# Patient Record
Sex: MALE | Race: WHITE | Employment: OTHER | URBAN - METROPOLITAN AREA
[De-identification: names, ages, dates, MRNs, and addresses within clinical notes are randomized per-mention and may not be internally consistent; named-entity substitution may affect disease eponyms.]

---

## 2022-05-21 ENCOUNTER — HOSPITAL ENCOUNTER (INPATIENT)
Age: 67
LOS: 3 days | Discharge: HOME | DRG: 392 | End: 2022-05-24
Attending: EMERGENCY MEDICINE | Admitting: INTERNAL MEDICINE
Payer: MEDICARE

## 2022-05-21 ENCOUNTER — APPOINTMENT (OUTPATIENT)
Dept: CT IMAGING | Facility: CLINIC | Age: 67
DRG: 392 | End: 2022-05-21
Payer: MEDICARE

## 2022-05-21 DIAGNOSIS — K57.20 DIVERTICULITIS OF COLON WITH PERFORATION: Primary | ICD-10-CM

## 2022-05-21 PROBLEM — C34.91 PRIMARY MALIGNANT NEOPLASM OF RIGHT LUNG (HCC): Status: ACTIVE | Noted: 2017-02-01

## 2022-05-21 PROBLEM — G47.33 OSA (OBSTRUCTIVE SLEEP APNEA): Status: ACTIVE | Noted: 2017-10-02

## 2022-05-21 PROBLEM — Z95.1 HISTORY OF QUADRUPLE BYPASS: Status: ACTIVE | Noted: 2022-05-21

## 2022-05-21 PROBLEM — I25.10 CAD (CORONARY ARTERY DISEASE): Status: ACTIVE | Noted: 2022-05-21

## 2022-05-21 PROBLEM — E78.5 HYPERLIPIDEMIA: Status: ACTIVE | Noted: 2022-05-21

## 2022-05-21 PROBLEM — I10 HYPERTENSION: Status: ACTIVE | Noted: 2022-05-21

## 2022-05-21 PROBLEM — E11.9 DIABETES MELLITUS (HCC): Status: ACTIVE | Noted: 2022-05-21

## 2022-05-21 LAB
ABSOLUTE EOS #: 0 K/UL (ref 0–0.4)
ABSOLUTE LYMPH #: 0.91 K/UL (ref 1–4.8)
ABSOLUTE MONO #: 2.72 K/UL (ref 0.1–0.8)
ALBUMIN SERPL-MCNC: 4.1 G/DL (ref 3.5–5.2)
ALBUMIN/GLOBULIN RATIO: 1.1 (ref 1–2.5)
ALP BLD-CCNC: 64 U/L (ref 40–129)
ALT SERPL-CCNC: 19 U/L (ref 5–41)
AMYLASE: 25 U/L (ref 28–100)
ANION GAP SERPL CALCULATED.3IONS-SCNC: 13 MMOL/L (ref 9–17)
AST SERPL-CCNC: 11 U/L
BASOPHILS # BLD: 1 % (ref 0–2)
BASOPHILS ABSOLUTE: 0.18 K/UL (ref 0–0.2)
BILIRUB SERPL-MCNC: 1.1 MG/DL (ref 0.3–1.2)
BILIRUBIN DIRECT: 0.31 MG/DL
BILIRUBIN, INDIRECT: 0.79 MG/DL (ref 0–1)
BUN BLDV-MCNC: 14 MG/DL (ref 8–23)
CALCIUM SERPL-MCNC: 9.3 MG/DL (ref 8.6–10.4)
CHLORIDE BLD-SCNC: 96 MMOL/L (ref 98–107)
CO2: 22 MMOL/L (ref 20–31)
CREAT SERPL-MCNC: 0.8 MG/DL (ref 0.7–1.2)
EOSINOPHILS RELATIVE PERCENT: 0 % (ref 1–4)
GFR AFRICAN AMERICAN: >60 ML/MIN
GFR NON-AFRICAN AMERICAN: >60 ML/MIN
GFR SERPL CREATININE-BSD FRML MDRD: ABNORMAL ML/MIN/{1.73_M2}
GLUCOSE BLD-MCNC: 115 MG/DL (ref 75–110)
GLUCOSE BLD-MCNC: 145 MG/DL (ref 75–110)
GLUCOSE BLD-MCNC: 196 MG/DL (ref 70–99)
HCT VFR BLD CALC: 46.2 % (ref 41–53)
HEMOGLOBIN: 15.3 G/DL (ref 13.5–17.5)
LACTIC ACID, SEPSIS: 1.4 MMOL/L (ref 0.5–1.9)
LIPASE: 38 U/L (ref 13–60)
LYMPHOCYTES # BLD: 5 % (ref 24–44)
MCH RBC QN AUTO: 30.3 PG (ref 26–34)
MCHC RBC AUTO-ENTMCNC: 33 G/DL (ref 31–37)
MCV RBC AUTO: 91.8 FL (ref 80–100)
MONOCYTES # BLD: 15 % (ref 1–7)
MORPHOLOGY: NORMAL
PDW BLD-RTO: 14.4 % (ref 12.5–15.4)
PLATELET # BLD: 226 K/UL (ref 140–450)
PMV BLD AUTO: 7.9 FL (ref 6–12)
POTASSIUM SERPL-SCNC: 4.1 MMOL/L (ref 3.7–5.3)
PRO-BNP: 90 PG/ML
RBC # BLD: 5.03 M/UL (ref 4.5–5.9)
SARS-COV-2, RAPID: NOT DETECTED
SEG NEUTROPHILS: 79 % (ref 36–66)
SEGMENTED NEUTROPHILS ABSOLUTE COUNT: 14.29 K/UL (ref 1.8–7.7)
SODIUM BLD-SCNC: 131 MMOL/L (ref 135–144)
SPECIMEN DESCRIPTION: NORMAL
TOTAL PROTEIN: 7.7 G/DL (ref 6.4–8.3)
TROPONIN, HIGH SENSITIVITY: 10 NG/L (ref 0–22)
WBC # BLD: 18.1 K/UL (ref 3.5–11)

## 2022-05-21 PROCEDURE — 6370000000 HC RX 637 (ALT 250 FOR IP): Performed by: NURSE PRACTITIONER

## 2022-05-21 PROCEDURE — 96361 HYDRATE IV INFUSION ADD-ON: CPT

## 2022-05-21 PROCEDURE — 96365 THER/PROPH/DIAG IV INF INIT: CPT

## 2022-05-21 PROCEDURE — 82947 ASSAY GLUCOSE BLOOD QUANT: CPT

## 2022-05-21 PROCEDURE — 80048 BASIC METABOLIC PNL TOTAL CA: CPT

## 2022-05-21 PROCEDURE — 83605 ASSAY OF LACTIC ACID: CPT

## 2022-05-21 PROCEDURE — 83880 ASSAY OF NATRIURETIC PEPTIDE: CPT

## 2022-05-21 PROCEDURE — 82150 ASSAY OF AMYLASE: CPT

## 2022-05-21 PROCEDURE — 6370000000 HC RX 637 (ALT 250 FOR IP): Performed by: EMERGENCY MEDICINE

## 2022-05-21 PROCEDURE — 6360000002 HC RX W HCPCS: Performed by: NURSE PRACTITIONER

## 2022-05-21 PROCEDURE — 80076 HEPATIC FUNCTION PANEL: CPT

## 2022-05-21 PROCEDURE — 87040 BLOOD CULTURE FOR BACTERIA: CPT

## 2022-05-21 PROCEDURE — 2580000003 HC RX 258: Performed by: EMERGENCY MEDICINE

## 2022-05-21 PROCEDURE — 6360000004 HC RX CONTRAST MEDICATION: Performed by: EMERGENCY MEDICINE

## 2022-05-21 PROCEDURE — 84484 ASSAY OF TROPONIN QUANT: CPT

## 2022-05-21 PROCEDURE — 83690 ASSAY OF LIPASE: CPT

## 2022-05-21 PROCEDURE — 2060000000 HC ICU INTERMEDIATE R&B

## 2022-05-21 PROCEDURE — 2500000003 HC RX 250 WO HCPCS: Performed by: NURSE PRACTITIONER

## 2022-05-21 PROCEDURE — 87635 SARS-COV-2 COVID-19 AMP PRB: CPT

## 2022-05-21 PROCEDURE — 99223 1ST HOSP IP/OBS HIGH 75: CPT | Performed by: NURSE PRACTITIONER

## 2022-05-21 PROCEDURE — 74177 CT ABD & PELVIS W/CONTRAST: CPT

## 2022-05-21 PROCEDURE — 93005 ELECTROCARDIOGRAM TRACING: CPT | Performed by: EMERGENCY MEDICINE

## 2022-05-21 PROCEDURE — 99285 EMERGENCY DEPT VISIT HI MDM: CPT

## 2022-05-21 PROCEDURE — 2580000003 HC RX 258: Performed by: NURSE PRACTITIONER

## 2022-05-21 PROCEDURE — 6360000002 HC RX W HCPCS: Performed by: EMERGENCY MEDICINE

## 2022-05-21 PROCEDURE — 36415 COLL VENOUS BLD VENIPUNCTURE: CPT

## 2022-05-21 PROCEDURE — 85025 COMPLETE CBC W/AUTO DIFF WBC: CPT

## 2022-05-21 RX ORDER — METFORMIN HYDROCHLORIDE EXTENDED-RELEASE TABLETS 1000 MG/1
2000 TABLET, FILM COATED, EXTENDED RELEASE ORAL
COMMUNITY

## 2022-05-21 RX ORDER — SODIUM CHLORIDE 0.9 % (FLUSH) 0.9 %
5-40 SYRINGE (ML) INJECTION EVERY 12 HOURS SCHEDULED
Status: DISCONTINUED | OUTPATIENT
Start: 2022-05-21 | End: 2022-05-24 | Stop reason: HOSPADM

## 2022-05-21 RX ORDER — LOSARTAN POTASSIUM 50 MG/1
50 TABLET ORAL DAILY
COMMUNITY

## 2022-05-21 RX ORDER — INSULIN LISPRO 100 [IU]/ML
0-3 INJECTION, SOLUTION INTRAVENOUS; SUBCUTANEOUS NIGHTLY
Status: CANCELLED | OUTPATIENT
Start: 2022-05-21

## 2022-05-21 RX ORDER — INSULIN LISPRO 100 [IU]/ML
0-6 INJECTION, SOLUTION INTRAVENOUS; SUBCUTANEOUS
Status: DISCONTINUED | OUTPATIENT
Start: 2022-05-21 | End: 2022-05-24 | Stop reason: HOSPADM

## 2022-05-21 RX ORDER — DEXTROSE MONOHYDRATE 50 MG/ML
100 INJECTION, SOLUTION INTRAVENOUS PRN
Status: CANCELLED | OUTPATIENT
Start: 2022-05-21

## 2022-05-21 RX ORDER — ONDANSETRON 2 MG/ML
4 INJECTION INTRAMUSCULAR; INTRAVENOUS EVERY 6 HOURS PRN
Status: DISCONTINUED | OUTPATIENT
Start: 2022-05-21 | End: 2022-05-24 | Stop reason: HOSPADM

## 2022-05-21 RX ORDER — INSULIN LISPRO 100 [IU]/ML
0-3 INJECTION, SOLUTION INTRAVENOUS; SUBCUTANEOUS NIGHTLY
Status: DISCONTINUED | OUTPATIENT
Start: 2022-05-21 | End: 2022-05-24 | Stop reason: HOSPADM

## 2022-05-21 RX ORDER — POLYETHYLENE GLYCOL 3350 17 G/17G
17 POWDER, FOR SOLUTION ORAL DAILY PRN
Status: DISCONTINUED | OUTPATIENT
Start: 2022-05-21 | End: 2022-05-24 | Stop reason: HOSPADM

## 2022-05-21 RX ORDER — ALBUTEROL SULFATE 90 UG/1
2 AEROSOL, METERED RESPIRATORY (INHALATION) EVERY 4 HOURS PRN
Status: DISCONTINUED | OUTPATIENT
Start: 2022-05-21 | End: 2022-05-24 | Stop reason: HOSPADM

## 2022-05-21 RX ORDER — ORAL SEMAGLUTIDE 3 MG/1
3 TABLET ORAL DAILY
COMMUNITY

## 2022-05-21 RX ORDER — SODIUM CHLORIDE 9 MG/ML
INJECTION, SOLUTION INTRAVENOUS CONTINUOUS
Status: DISCONTINUED | OUTPATIENT
Start: 2022-05-21 | End: 2022-05-23

## 2022-05-21 RX ORDER — UMECLIDINIUM 62.5 UG/1
1 AEROSOL, POWDER ORAL DAILY
COMMUNITY

## 2022-05-21 RX ORDER — PANTOPRAZOLE SODIUM 40 MG/1
40 TABLET, DELAYED RELEASE ORAL DAILY
COMMUNITY

## 2022-05-21 RX ORDER — METOPROLOL TARTRATE 5 MG/5ML
2.5 INJECTION INTRAVENOUS EVERY 12 HOURS
Status: DISCONTINUED | OUTPATIENT
Start: 2022-05-21 | End: 2022-05-22

## 2022-05-21 RX ORDER — ATORVASTATIN CALCIUM 40 MG/1
40 TABLET, FILM COATED ORAL DAILY
COMMUNITY

## 2022-05-21 RX ORDER — MAGNESIUM SULFATE 1 G/100ML
1000 INJECTION INTRAVENOUS PRN
Status: DISCONTINUED | OUTPATIENT
Start: 2022-05-21 | End: 2022-05-24 | Stop reason: HOSPADM

## 2022-05-21 RX ORDER — POTASSIUM CHLORIDE 20 MEQ/1
40 TABLET, EXTENDED RELEASE ORAL PRN
Status: DISCONTINUED | OUTPATIENT
Start: 2022-05-21 | End: 2022-05-24 | Stop reason: HOSPADM

## 2022-05-21 RX ORDER — POTASSIUM CHLORIDE 7.45 MG/ML
10 INJECTION INTRAVENOUS PRN
Status: DISCONTINUED | OUTPATIENT
Start: 2022-05-21 | End: 2022-05-24 | Stop reason: HOSPADM

## 2022-05-21 RX ORDER — 0.9 % SODIUM CHLORIDE 0.9 %
70 INTRAVENOUS SOLUTION INTRAVENOUS ONCE
Status: DISCONTINUED | OUTPATIENT
Start: 2022-05-21 | End: 2022-05-24 | Stop reason: HOSPADM

## 2022-05-21 RX ORDER — SODIUM CHLORIDE 9 MG/ML
INJECTION, SOLUTION INTRAVENOUS PRN
Status: DISCONTINUED | OUTPATIENT
Start: 2022-05-21 | End: 2022-05-24 | Stop reason: HOSPADM

## 2022-05-21 RX ORDER — ACETAMINOPHEN 500 MG
1000 TABLET ORAL ONCE
Status: COMPLETED | OUTPATIENT
Start: 2022-05-21 | End: 2022-05-21

## 2022-05-21 RX ORDER — SODIUM CHLORIDE 0.9 % (FLUSH) 0.9 %
10 SYRINGE (ML) INJECTION PRN
Status: DISCONTINUED | OUTPATIENT
Start: 2022-05-21 | End: 2022-05-24 | Stop reason: HOSPADM

## 2022-05-21 RX ORDER — ALBUTEROL SULFATE 90 UG/1
2 AEROSOL, METERED RESPIRATORY (INHALATION)
COMMUNITY

## 2022-05-21 RX ORDER — DEXTROSE MONOHYDRATE 50 MG/ML
100 INJECTION, SOLUTION INTRAVENOUS PRN
Status: DISCONTINUED | OUTPATIENT
Start: 2022-05-21 | End: 2022-05-24 | Stop reason: HOSPADM

## 2022-05-21 RX ORDER — INSULIN LISPRO 100 [IU]/ML
0-6 INJECTION, SOLUTION INTRAVENOUS; SUBCUTANEOUS
Status: CANCELLED | OUTPATIENT
Start: 2022-05-21

## 2022-05-21 RX ORDER — SODIUM CHLORIDE 0.9 % (FLUSH) 0.9 %
10 SYRINGE (ML) INJECTION PRN
Status: DISCONTINUED | OUTPATIENT
Start: 2022-05-21 | End: 2022-05-21

## 2022-05-21 RX ORDER — MORPHINE SULFATE 2 MG/ML
2 INJECTION, SOLUTION INTRAMUSCULAR; INTRAVENOUS
Status: DISCONTINUED | OUTPATIENT
Start: 2022-05-21 | End: 2022-05-24 | Stop reason: HOSPADM

## 2022-05-21 RX ORDER — METOPROLOL TARTRATE 5 MG/5ML
2.5 INJECTION INTRAVENOUS EVERY 6 HOURS
Status: DISCONTINUED | OUTPATIENT
Start: 2022-05-21 | End: 2022-05-21

## 2022-05-21 RX ORDER — 0.9 % SODIUM CHLORIDE 0.9 %
500 INTRAVENOUS SOLUTION INTRAVENOUS ONCE
Status: COMPLETED | OUTPATIENT
Start: 2022-05-21 | End: 2022-05-21

## 2022-05-21 RX ADMIN — IOPAMIDOL 75 ML: 755 INJECTION, SOLUTION INTRAVENOUS at 10:50

## 2022-05-21 RX ADMIN — INSULIN LISPRO 1 UNITS: 100 INJECTION, SOLUTION INTRAVENOUS; SUBCUTANEOUS at 19:52

## 2022-05-21 RX ADMIN — SODIUM CHLORIDE: 9 INJECTION, SOLUTION INTRAVENOUS at 16:57

## 2022-05-21 RX ADMIN — SODIUM CHLORIDE, PRESERVATIVE FREE 10 ML: 5 INJECTION INTRAVENOUS at 10:51

## 2022-05-21 RX ADMIN — PIPERACILLIN AND TAZOBACTAM 3375 MG: 3; .375 INJECTION, POWDER, LYOPHILIZED, FOR SOLUTION INTRAVENOUS at 23:04

## 2022-05-21 RX ADMIN — PIPERACILLIN AND TAZOBACTAM 3375 MG: 3; .375 INJECTION, POWDER, LYOPHILIZED, FOR SOLUTION INTRAVENOUS at 12:00

## 2022-05-21 RX ADMIN — MORPHINE SULFATE 2 MG: 2 INJECTION, SOLUTION INTRAMUSCULAR; INTRAVENOUS at 23:05

## 2022-05-21 RX ADMIN — PIPERACILLIN AND TAZOBACTAM 4500 MG: 4; .5 INJECTION, POWDER, LYOPHILIZED, FOR SOLUTION INTRAVENOUS at 18:08

## 2022-05-21 RX ADMIN — SODIUM CHLORIDE 500 ML: 9 INJECTION, SOLUTION INTRAVENOUS at 09:55

## 2022-05-21 RX ADMIN — ACETAMINOPHEN 1000 MG: 500 TABLET ORAL at 09:31

## 2022-05-21 RX ADMIN — Medication 70 ML: at 10:51

## 2022-05-21 RX ADMIN — SODIUM CHLORIDE, PRESERVATIVE FREE 20 MG: 5 INJECTION INTRAVENOUS at 19:42

## 2022-05-21 RX ADMIN — METOPROLOL TARTRATE 2.5 MG: 1 INJECTION, SOLUTION INTRAVENOUS at 19:42

## 2022-05-21 ASSESSMENT — PAIN SCALES - GENERAL
PAINLEVEL_OUTOF10: 6
PAINLEVEL_OUTOF10: 6
PAINLEVEL_OUTOF10: 3
PAINLEVEL_OUTOF10: 6
PAINLEVEL_OUTOF10: 3
PAINLEVEL_OUTOF10: 6

## 2022-05-21 ASSESSMENT — PAIN DESCRIPTION - LOCATION
LOCATION: ABDOMEN
LOCATION: ABDOMEN;HEAD
LOCATION: ABDOMEN

## 2022-05-21 ASSESSMENT — PAIN DESCRIPTION - ORIENTATION: ORIENTATION: RIGHT

## 2022-05-21 ASSESSMENT — ENCOUNTER SYMPTOMS
DIARRHEA: 0
VOMITING: 0
SHORTNESS OF BREATH: 0
ABDOMINAL PAIN: 1
STRIDOR: 0
COUGH: 0
BLOOD IN STOOL: 0
NAUSEA: 0
CONSTIPATION: 0
WHEEZING: 0

## 2022-05-21 ASSESSMENT — PAIN DESCRIPTION - FREQUENCY
FREQUENCY: CONTINUOUS
FREQUENCY: INTERMITTENT

## 2022-05-21 ASSESSMENT — PAIN DESCRIPTION - DESCRIPTORS
DESCRIPTORS: CRAMPING
DESCRIPTORS: CRAMPING

## 2022-05-21 ASSESSMENT — PAIN - FUNCTIONAL ASSESSMENT: PAIN_FUNCTIONAL_ASSESSMENT: 0-10

## 2022-05-21 NOTE — CONSULTS
General Surgery:  Consult Note        PATIENT NAME: Kraig Aldana   YOB: 1955    ADMISSION DATE: 5/21/2022  9:12 AM     Admitting Provider: Dr. Eri Miles Physician: Dr. Jaqueline Noel: 5/21/2022    Chief Complaint: Abdominal pain  Consult Regarding: Acute sigmoid diverticulitis with microperforation    HISTORY OF PRESENT ILLNESS:  The patient is a 79 y.o. male who presented to an outside emergency department with 3 days of abdominal pain. At outlying facility patient was noted to have leukocytosis of 18.1 as well as a CT scan which showed diverticulitis of the sigmoid colon with small dots of free air in the mesentery. Patient was transferred to [facility] for evaluation by general surgery. Noted that he had some fever and chills at his house. He has also had some associated nausea. States that he has had diarrhea but he has diarrhea most days even before the abdominal pain. States he had an episode of pain like this about 1 year ago, he did not seek treatment at that time. Patient denies any prior abdominal surgeries. Patient states he has had colonoscopy within the last 10 years, which was normal.  Patient has had coronary artery bypass surgery as well as a lung procedure. Patient has medical history of diabetes mellitus, hyperlipidemia, hypertension.       Past Medical History:        Diagnosis Date    CAD (coronary artery disease)     Diabetes mellitus ([facility] [address]     History of quadruple bypass     Hyperlipidemia     Hypertension        Past Surgical History:        Procedure Laterality Date    CARDIAC SURGERY         Medications Prior to Admission:   Medications Prior to Admission: albuterol sulfate HFA (VENTOLIN HFA) 108 (90 Base) MCG/ACT inhaler, Inhale 2 puffs into the lungs every 4-6 hours as needed for Wheezing  atorvastatin (LIPITOR) 40 MG tablet, Take 40 mg by mouth daily  vitamin D (CHOLECALCIFEROL) 25 MCG (1000 UT) TABS tablet, Take 1,000 Units by mouth daily  losartan (COZAAR) 50 MG tablet, Take 50 mg by mouth daily  metoprolol tartrate (LOPRESSOR) 25 MG tablet, Take 12.5 mg by mouth daily Takes 1/2 tab (=12.5mg) once daily  pantoprazole (PROTONIX) 40 MG tablet, Take 40 mg by mouth daily  Semaglutide (RYBELSUS) 3 MG TABS, Take 3 mg by mouth daily  Umeclidinium Bromide (INCRUSE ELLIPTA) 62.5 MCG/INH AEPB, Inhale 1 puff into the lungs daily  metFORMIN, OSM, (FORTAMET) 1000 MG extended release tablet, Take 2,000 mg by mouth daily (with breakfast) Takes 2 tabs (=2000mg) once daily  Propylene Glycol (SYSTANE COMPLETE OP), Place 1 drop into both eyes 4 times daily    Allergies:  Cat hair extract and Shrimp (diagnostic)    Social History:   Social History     Socioeconomic History    Marital status:      Spouse name: Not on file    Number of children: Not on file    Years of education: Not on file    Highest education level: Not on file   Occupational History    Not on file   Tobacco Use    Smoking status: Former Smoker    Smokeless tobacco: Never Used   Vaping Use    Vaping Use: Never used   Substance and Sexual Activity    Alcohol use: Never    Drug use: Never    Sexual activity: Not on file   Other Topics Concern    Not on file   Social History Narrative    Not on file     Social Determinants of Health     Financial Resource Strain:     Difficulty of Paying Living Expenses: Not on file   Food Insecurity:     Worried About Running Out of Food in the Last Year: Not on file    Eugenio of Food in the Last Year: Not on file   Transportation Needs:     Lack of Transportation (Medical): Not on file    Lack of Transportation (Non-Medical):  Not on file   Physical Activity:     Days of Exercise per Week: Not on file    Minutes of Exercise per Session: Not on file   Stress:     Feeling of Stress : Not on file   Social Connections:     Frequency of Communication with Friends and Family: Not on file    Frequency of Social Gatherings with Friends and Family: Not on file    Attends Caodaism Services: Not on file    Active Member of Clubs or Organizations: Not on file    Attends Club or Organization Meetings: Not on file    Marital Status: Not on file   Intimate Partner Violence:     Fear of Current or Ex-Partner: Not on file    Emotionally Abused: Not on file    Physically Abused: Not on file    Sexually Abused: Not on file   Housing Stability:     Unable to Pay for Housing in the Last Year: Not on file    Number of [place] in the Last Year: Not on file    Unstable Housing in the Last Year: Not on file       Family History:   History reviewed. No pertinent family history. REVIEW OF SYSTEMS:    CONSTITUTIONAL: Positive for fevers/chills  HEENT: Denies rhinorrhea, dysphagia, odynphagia. CARDIOVASCULAR: Denies recent chest pain; positive for history of CABG  RESPIRATORY: Denies recent history of shortness of breath; positive for history of lung surgery  GASTROINTESTINAL: Positive for abdominal pain, nausea, diarrhea  GENITOURINARY: Denies increased frequency or dysuria. HEMATOLOGIC/LYMPHATIC: Denies history of anemia or DVTs. ENDOCRINE: Positive for diabetes  NEURO: Denies history of CVA, TIA. Review of systems negative unless listed above. PHYSICAL EXAM:    VITALS:  /83   Pulse 117   Temp 98.5 °F (36.9 °C) (Oral)   Resp 18   Ht 5' 10\" (1.778 m)   Wt 264 lb (119.7 kg)   SpO2 96%   BMI 37.88 kg/m²   INTAKE/OUTPUT:   No intake or output data in the 24 hours ending 05/21/22 1741    CONSTITUTIONAL:  awake, alert, not distressed  HEENT: Normocephalic/atraumatic, without obvious abnormality. NECK:  Supple, symmetrical, trachea midline   CARDIOVASCULAR: Tachycardia with regular rhythm. LUNGS: Good chest rise bilaterally, no accessory muscle use  ABDOMEN: Soft, distended, tender to palpation in the right hemiabdomen, not peritoneal  MUSCULOSKELETAL: Muscle strength intact in all extremities bilaterally.   NEUROLOGIC: CN II- XII intact. Gross motor intact without focal weakness. SKIN: No cyanosis, rashes, or edema noted. Orientation:   oriented to person, place, and time    CBC with Differential:    Lab Results   Component Value Date    WBC 18.1 05/21/2022    RBC 5.03 05/21/2022    HGB 15.3 05/21/2022    HCT 46.2 05/21/2022     05/21/2022    MCV 91.8 05/21/2022    MCH 30.3 05/21/2022    MCHC 33.0 05/21/2022    RDW 14.4 05/21/2022    LYMPHOPCT 5 05/21/2022    MONOPCT 15 05/21/2022    BASOPCT 1 05/21/2022    MONOSABS 2.72 05/21/2022    LYMPHSABS 0.91 05/21/2022    EOSABS 0.00 05/21/2022    BASOSABS 0.18 05/21/2022     BMP:    Lab Results   Component Value Date     05/21/2022    K 4.1 05/21/2022    CL 96 05/21/2022    CO2 22 05/21/2022    BUN 14 05/21/2022    LABALBU 4.1 05/21/2022    CREATININE 0.80 05/21/2022    CALCIUM 9.3 05/21/2022    GFRAA >60 05/21/2022    LABGLOM >60 05/21/2022    GLUCOSE 196 05/21/2022       Pertinent Radiology:   CT ABDOMEN PELVIS W IV CONTRAST Additional Contrast? None    Result Date: 5/21/2022  EXAMINATION: CT OF THE ABDOMEN AND PELVIS WITH CONTRAST 5/21/2022 10:33 am TECHNIQUE: CT of the abdomen and pelvis was performed with the administration of intravenous contrast. Multiplanar reformatted images are provided for review. Automated exposure control, iterative reconstruction, and/or weight based adjustment of the mA/kV was utilized to reduce the radiation dose to as low as reasonably achievable. COMPARISON: None. HISTORY: ORDERING SYSTEM PROVIDED HISTORY: Pain TECHNOLOGIST PROVIDED HISTORY: Pain Decision Support Exception - unselect if not a suspected or confirmed emergency medical condition->Emergency Medical Condition (MA) Reason for Exam: Pt. C/o abdominal pain x 3 days. Hx hernia, open heart, lung cancer. FINDINGS: Lower Chest: Several granulomatous calcifications visualized lung bases otherwise unremarkable appearance. Organs: Hepatic steatosis. No focal lesion.   Spleen pancreas adrenal glands unremarkable. Prior cholecystectomy. Rounded hypodensity inferior left kidney most likely cyst or proximally 3 cm.  5 mm nonobstructing superior pole right nephrolithiasis. Additional subcentimeter probable right renal cyst. GI/Bowel: Stomach is normal.  Small bowel is normal.  Appendix is normal. Numerous diverticula throughout the distal colon. Significant wall thickening at the sigmoid with adjacent fat stranding and a small foci of intraperitoneal air regional to mid sigmoid colon consistent with local perforation. No abscess collection. No additional free air visualized. Pelvis: Urinary bladder demonstrates mild wall thickening which may be reactive to adjacent inflammation, underdistention or cystitis. Prostate enlarged. Peritoneum/Retroperitoneum: Atherosclerosis abdominal aorta without aneurysm. Otherwise unremarkable. Bones/Soft Tissues: No acute osseous abnormality. 1. Severe acute diverticulitis/colitis sigmoid colon with the adjacent fat stranding and regional intraperitoneal free air consistent with local perforation. No abscess collection at this time. 2. Urinary bladder wall thickening may be underdistention or cystitis which could be related to adjacent inflammatory change. Motion clinical findings and urinalysis required. 3. Hepatic steatosis. 4. Nonobstructing right nephrolithiasis. 5. Bilateral probable renal cysts. 6. Prostatomegaly. Critical results were called by Dr. Marlo Drake DO to Dr. Alexandra Garcia on 5/21/2022 at 11:35. ASSESSMENT:  Active Hospital Problems    Diagnosis Date Noted    Diverticulitis of colon with perforation [K57.20] 05/21/2022     Priority: Medium       3 26-year-old male with acute sigmoid diverticulitis with microperforation    Plan:  1. Continue medical mgmt and supportive care per primary  2. NPO, bowel rest  3. IVF  4. Continue IV Zosyn  5.  Discussed with patient conservative management at this time, however stated that if patient worsens he may need emergent operative intervention  6. Also discussed that patient will likely need sigmoid resection in the future if he is able to get through this acute episode without surgical intervention    Discussed with Dr. Damaris Parisi      Electronically signed by Quincy Summers DO  on 5/21/2022 at 5:41 PM   Attending Physician Statement  I have discussed the case, including pertinent history and exam findings with the resident. I agree with the assessment, plan and orders as documented by the resident.     Manage with antibiotics and bowel rest  Check Labs in am  Eventual colonoscopy and consider semielective sigmoid resection due to recurrent episodes and due to complicated diverticulitis

## 2022-05-21 NOTE — H&P
[facility]  Office: [address], DO, Steve Alfaro, DO, Leroy Flores, DO, Ebonie Victorharris Blood, DO, Karri Perales MD, Janice Vines MD, Guicho Richey MD, Maggy Pearson MD, Jose J Diaz MD, Keyana Torrez MD, Rona Botello MD, Bhanu Moy, DO, Krysten Bhagat, DO, Surinder Ruiz MD,  Arsh Nicole, DO, Paz Ceballos MD, Amina Astorga MD, Dieter Vega MD, Cate Sherman, DO, Sukumar Saavedra MD, Kristie Patel MD, Lilly Ramirez MD, Mary De León, [facility], [facility], CNP, Noah Barthel, CNP, Brendan Iglesias, CNP, Sola De La Torre, CNP, Renuka Arcos, CNP, Garo Agudelo PA-C, Erich Dickerson, [facility], Daphnie Gutierrez, CNP, Anna Montano, CNP, Renetta Win, CNP, Cristóbal Mccoy, CNS, Arianna Barbour, [facility], Dino Dan, CNP, Mukesh Medina, CNP, Lori Mcnair, [facility]    HISTORY AND PHYSICAL EXAMINATION            Date:   5/21/2022  Patient name:  Dev Mcdonnell  Date of admission:  5/21/2022  9:12 AM  MRN:   [id number]  Account:  [de-identified]  YOB: 1955  PCP:    Clement Irwin MD  Room:   [address] [place] [address]  Code Status:    Full Code    Chief Complaint:     Chief Complaint   Patient presents with    Abdominal Pain     3  days          History Obtained From:     patient    History of Present Illness:     Dev Mcdonnell is a 79 y.o. Non- / non  male who presents with Abdominal Pain (3  days   )   and is admitted to the hospital for the management of Diverticulitis of colon with perforation. Presented to the ER with a 3-day complaint of abdominal pain and cramping. He denies nausea, vomiting chest pain or shortness of breath. He denies a fever at home but he presented with a temp of 100.3. He states he has had episodes similar to this in the last year or 2 but they have been self-limiting.   He states he had an EGD a couple years ago and that he has a remote history of an EGD and colonoscopy around 10 years ago. He also has a history of open heart surgery in 2018 and right lung cancer both followed by a physician at [facility]. Patient denies a history of chemotherapy or radiation he did have right sided lung resection. He also has a history of COPD and RUT but he is not compliant with CPAP. Today CT showed severe acute diverticulitis/colitis sigmoid colon with the adjacent fat stranding and regional intraperitoneal free air consistent with local perforation. No abscess or collection is seen. Patient was given fluids and started on Zosyn. General surgery following. Per Dr. Channing Gooden hold anticoagulation for now okay for ice chips/sips of clears. Past Medical History:     Past Medical History:   Diagnosis Date    CAD (coronary artery disease)     Diabetes mellitus ([facility] [address]     History of quadruple bypass     Hyperlipidemia     Hypertension         Past Surgical History:     Past Surgical History:   Procedure Laterality Date    CARDIAC SURGERY  2018    COLONOSCOPY      10 yrs ago    LUNG CANCER SURGERY  2017    right lower lobe resection-he thinks    UPPER GASTROINTESTINAL ENDOSCOPY      2 yrs ago        Medications Prior to Admission:     Prior to Admission medications    Medication Sig Start Date End Date Taking?  Authorizing Provider   albuterol sulfate HFA (VENTOLIN HFA) 108 (90 Base) MCG/ACT inhaler Inhale 2 puffs into the lungs every 4-6 hours as needed for Wheezing   Yes Historical Provider, MD   atorvastatin (LIPITOR) 40 MG tablet Take 40 mg by mouth daily   Yes Historical Provider, MD   vitamin D (CHOLECALCIFEROL) 25 MCG (1000 UT) TABS tablet Take 1,000 Units by mouth daily   Yes Historical Provider, MD   losartan (COZAAR) 50 MG tablet Take 50 mg by mouth daily   Yes Historical Provider, MD   metoprolol tartrate (LOPRESSOR) 25 MG tablet Take 12.5 mg by mouth daily Takes 1/2 tab (=12.5mg) once daily   Yes Historical Provider, MD   pantoprazole (PROTONIX) 40 MG tablet Take 40 mg by mouth daily Yes Historical Provider, MD   Semaglutide (RYBELSUS) 3 MG TABS Take 3 mg by mouth daily   Yes Historical Provider, MD   Umeclidinium Bromide (INCRUSE ELLIPTA) 62.5 MCG/INH AEPB Inhale 1 puff into the lungs daily   Yes Historical Provider, MD   metFORMIN, OSM, (FORTAMET) 1000 MG extended release tablet Take 2,000 mg by mouth daily (with breakfast) Takes 2 tabs (=2000mg) once daily   Yes Historical Provider, MD   Propylene Glycol (SYSTANE COMPLETE OP) Place 1 drop into both eyes 4 times daily   Yes Historical Provider, MD        Allergies:     Cat hair extract and Shrimp (diagnostic)    Social History:     Tobacco:    reports that he quit smoking about 7 years ago. He has a 72.00 pack-year smoking history. He has never used smokeless tobacco.  Alcohol:      reports no history of alcohol use. Drug Use:  reports no history of drug use. Family History:     Family History   Problem Relation Age of Onset    Alzheimer's Disease Mother     Heart Attack Father        Review of Systems:     Positive and Negative as described in HPI. Review of Systems   Constitutional: Negative for chills, diaphoresis and fever. HENT: Negative for congestion and hearing loss. Respiratory: Negative for cough, shortness of breath, wheezing and stridor. Cardiovascular: Negative for chest pain, palpitations and leg swelling. Gastrointestinal: Positive for abdominal pain. Negative for blood in stool, constipation, diarrhea, nausea and vomiting. Genitourinary: Negative for dysuria and frequency. Musculoskeletal: Negative for myalgias. Skin: Negative for rash. Neurological: Negative for dizziness, seizures and headaches. Psychiatric/Behavioral: The patient is not nervous/anxious.         Physical Exam:   /83   Pulse 117   Temp 98.5 °F (36.9 °C) (Oral)   Resp 18   Ht 5' 10\" (1.778 m)   Wt 264 lb (119.7 kg)   SpO2 96%   BMI 37.88 kg/m²   Temp (24hrs), Av.6 °F (37.6 °C), Min:98.5 °F (36.9 °C), Max:100.3 °F (37.9 °C)    Recent Labs     05/21/22  1749   POCGLU 115*     No intake or output data in the 24 hours ending 05/21/22 1822    Physical Exam  Vitals and nursing note reviewed. Constitutional:       Appearance: Normal appearance. HENT:      Mouth/Throat:      Mouth: Mucous membranes are moist.   Eyes:      Extraocular Movements: Extraocular movements intact. Cardiovascular:      Rate and Rhythm: Normal rate and regular rhythm. Pulses: Normal pulses. Heart sounds: Normal heart sounds. Pulmonary:      Effort: Pulmonary effort is normal.      Breath sounds: Normal breath sounds. Abdominal:      General: Bowel sounds are normal. There is distension. Tenderness: There is abdominal tenderness in the right lower quadrant and left lower quadrant. There is no guarding or rebound. Hernia: A hernia is present. Comments: Only to deep palpation now   Musculoskeletal:         General: Normal range of motion. Skin:     General: Skin is warm and dry. Capillary Refill: Capillary refill takes less than 2 seconds. Neurological:      Mental Status: He is alert and oriented to person, place, and time.    Psychiatric:         Mood and Affect: Mood normal.         Investigations:      Laboratory Testing:  Recent Results (from the past 24 hour(s))   EKG 12 Lead    Collection Time: 05/21/22  9:36 AM   Result Value Ref Range    Ventricular Rate 131 BPM    Atrial Rate 131 BPM    P-R Interval 140 ms    QRS Duration 86 ms    Q-T Interval 292 ms    QTc Calculation (Bazett) 431 ms    P Axis 27 degrees    R Axis 70 degrees    T Axis 45 degrees   CBC with Auto Differential    Collection Time: 05/21/22  9:40 AM   Result Value Ref Range    WBC 18.1 (H) 3.5 - 11.0 k/uL    RBC 5.03 4.5 - 5.9 m/uL    Hemoglobin 15.3 13.5 - 17.5 g/dL    Hematocrit 46.2 41 - 53 %    MCV 91.8 80 - 100 fL    MCH 30.3 26 - 34 pg    MCHC 33.0 31 - 37 g/dL    RDW 14.4 12.5 - 15.4 %    Platelets 699 850 - 816 k/uL    MPV 7.9 6.0 - 12.0 fL    Seg Neutrophils 79 (H) 36 - 66 %    Lymphocytes 5 (L) 24 - 44 %    Monocytes 15 (H) 1 - 7 %    Eosinophils % 0 (L) 1 - 4 %    Basophils 1 0 - 2 %    Segs Absolute 14.29 (H) 1.8 - 7.7 k/uL    Absolute Lymph # 0.91 (L) 1.0 - 4.8 k/uL    Absolute Mono # 2.72 (H) 0.1 - 0.8 k/uL    Absolute Eos # 0.00 0.0 - 0.4 k/uL    Basophils Absolute 0.18 0.0 - 0.2 k/uL    Morphology Normal    Basic Metabolic Panel    Collection Time: 05/21/22  9:40 AM   Result Value Ref Range    Glucose 196 (H) 70 - 99 mg/dL    BUN 14 8 - 23 mg/dL    CREATININE 0.80 0.70 - 1.20 mg/dL    Calcium 9.3 8.6 - 10.4 mg/dL    Sodium 131 (L) 135 - 144 mmol/L    Potassium 4.1 3.7 - 5.3 mmol/L    Chloride 96 (L) 98 - 107 mmol/L    CO2 22 20 - 31 mmol/L    Anion Gap 13 9 - 17 mmol/L    GFR Non-African American >60 >60 mL/min    GFR African American >60 >60 mL/min    GFR Comment         Amylase    Collection Time: 05/21/22  9:40 AM   Result Value Ref Range    Amylase 25 (L) 28 - 100 U/L   Hepatic Function Panel    Collection Time: 05/21/22  9:40 AM   Result Value Ref Range    Albumin 4.1 3.5 - 5.2 g/dL    Alkaline Phosphatase 64 40 - 129 U/L    ALT 19 5 - 41 U/L    AST 11 <40 U/L    Total Bilirubin 1.10 0.3 - 1.2 mg/dL    Bilirubin, Direct 0.31 (H) <0.31 mg/dL    Bilirubin, Indirect 0.79 0.00 - 1.00 mg/dL    Total Protein 7.7 6.4 - 8.3 g/dL    Albumin/Globulin Ratio 1.1 1.0 - 2.5   Lipase    Collection Time: 05/21/22  9:40 AM   Result Value Ref Range    Lipase 38 13 - 60 U/L   Troponin    Collection Time: 05/21/22  9:40 AM   Result Value Ref Range    Troponin, High Sensitivity 10 0 - 22 ng/L   Lactate, Sepsis    Collection Time: 05/21/22  9:40 AM   Result Value Ref Range    Lactic Acid, Sepsis 1.4 0.5 - 1.9 mmol/L   Brain Natriuretic Peptide    Collection Time: 05/21/22  9:40 AM   Result Value Ref Range    Pro-BNP 90 <300 pg/mL   Culture, Blood 1    Collection Time: 05/21/22  9:44 AM    Specimen: Blood   Result Value Ref Range    Specimen Description Carolina Ortega BLOOD     Special Requests LEFT ANTICUBE 20CC     Culture NO GROWTH <24 HRS    Culture, Blood 1    Collection Time: 05/21/22  9:50 AM    Specimen: Blood   Result Value Ref Range    Specimen Description . BLOOD     Special Requests RIGHT ANTICUBE 20CC     Culture NO GROWTH <24 HRS    COVID-19, Rapid    Collection Time: 05/21/22 11:17 AM    Specimen: Nasopharyngeal Swab   Result Value Ref Range    Specimen Description . NASOPHARYNGEAL SWAB     SARS-CoV-2, Rapid Not Detected Not Detected   POC Glucose Fingerstick    Collection Time: 05/21/22  5:49 PM   Result Value Ref Range    POC Glucose 115 (H) 75 - 110 mg/dL       Imaging/Diagnostics:  CT ABDOMEN PELVIS W IV CONTRAST Additional Contrast? None    Result Date: 5/21/2022  1. Severe acute diverticulitis/colitis sigmoid colon with the adjacent fat stranding and regional intraperitoneal free air consistent with local perforation. No abscess collection at this time. 2. Urinary bladder wall thickening may be underdistention or cystitis which could be related to adjacent inflammatory change. Motion clinical findings and urinalysis required. 3. Hepatic steatosis. 4. Nonobstructing right nephrolithiasis. 5. Bilateral probable renal cysts. 6. Prostatomegaly. Critical results were called by Dr. Adriana Mattson, DO to Dr. Florencia Newell on 5/21/2022 at 11:35.        Assessment :      Hospital Problems           Last Modified POA    * (Principal) Diverticulitis of colon with perforation 5/21/2022 Yes    Type 2 diabetes mellitus, without long-term current use of insulin ([address] 5/21/2022 Yes    CAD (coronary artery disease) 5/21/2022 Yes    Hyperlipidemia 5/21/2022 Yes    Hypertension 5/21/2022 Yes    Chronic obstructive lung disease ([address] 5/21/2022 Yes    RUT (obstructive sleep apnea) 5/21/2022 Yes    Overview Signed 5/21/2022  6:01 PM by YOLY Hoover - CNP     Non compliance                Plan:     Patient status inpatient in the  Progressive Unit/Step down    Diverticulitis of colon with perforation  Continue Zosyn  Blood cultures pending  Hydration  N.p.o. except ice chips and sips of clear liquids  General surgery following  Hold anticoagulation    Type 2 diabetes  Check glucose before meals and at bedtime and cover with insulin sliding scale as needed  Hold metformin     History of CAD and hypertension  Lopressor 2.5 IV every 12 hours around-the-clock with hold parameters  Hold statin and losartan for now    Mechanical DVT prophylaxis    History of COPD  Home inhaler and Elipta resumed    History of RUT and noncompliance  Patient says \" hell no I am not going to wear that thing\"  Compliance encouraged        Consultations:   [address]     Patient is admitted as inpatient status because of co-morbidities listed above, severity of signs and symptoms as outlined, requirement for current medical therapies and most importantly because of direct risk to patient if care not provided in a hospital setting. Expected length of stay > 48 hours.     YOLY Amor - CNP  5/21/2022  6:22 PM    Copy sent to Dr. Juan José Granado MD

## 2022-05-21 NOTE — ED PROVIDER NOTES
[facility]  [facility]  Phone: [address]      Pt Name: Maria Victoria Simmons  MRN: [id number]  [place] 1955  Date of evaluation: 5/21/2022    CHIEF COMPLAINT       Chief Complaint   Patient presents with    Abdominal Pain     3  days          HISTORY OF PRESENT ILLNESS    Maria Victoria Simmons is a 79 y.o. male who presents to the emergency room complaining of lower abdominal pain and cramping for the past 3 days. He denies any diarrhea. Denies fever presents with a temperature of greater than 100. No chest pain or shortness of breath. Does have a significant cardiac history including bypass. Denies abdominal surgeries. No recent travel or antibiotics no known sick contacts. Denies any other complaints. REVIEW OF SYSTEMS       Constitutional: No fevers or chills   HEENT: No sore throat, rhinorrhea, or earache   Eyes: No blurry vision or double vision no drainage   Cardiovascular: No chest pain or tachycardia   Respiratory: No wheezing chronic shortness of breath no cough  Gastrointestinal: No nausea, vomiting, diarrhea, constipation, positive abdominal pain  : No hematuria or dysuria   Musculoskeletal: No swelling or pain   Skin: No rash   Neurological: No focal neurologic complaints, paresthesias, weakness, or headache     PAST MEDICAL HISTORY    has a past medical history of CAD (coronary artery disease), Diabetes mellitus ([address] History of quadruple bypass, Hyperlipidemia, and Hypertension. SURGICAL HISTORY      has a past surgical history that includes Cardiac surgery. CURRENT MEDICATIONS       Previous Medications    No medications on file       ALLERGIES     is allergic to cat hair extract and shrimp (diagnostic). FAMILY HISTORY     has no family status information on file. family history is not on file. SOCIAL HISTORY      reports that he has quit smoking.  He has never used smokeless tobacco. He reports that he does not drink alcohol and does not use drugs. PHYSICAL EXAM       ED Triage Vitals [05/21/22 0914]   BP Temp Temp Source Pulse Resp SpO2 Height Weight   (!) 143/99 100.3 °F (37.9 °C) Oral 129 14 96 % 5' 10\" (1.778 m) 264 lb (119.7 kg)       Constitutional: Alert, oriented x3, nontoxic, answering questions appropriately, acting properly for age, in no acute distress febrile  HEENT: Extraocular muscles intact, mucus membranes dry pupils equal, round, reactive to light,   Neck: Trachea midline   Cardiovascular: Regular rhythm and tachycardic no murmurs   Respiratory: Diminished to auscultation bilaterally no wheezes, rhonchi, rales, no respiratory distress no tachypnea no retractions no hypoxia  Gastrointestinal: Soft, nontender, obese, diminished bowel sounds. No rebound, rigidity, or guarding. No abdominal bruit no pulsatile mass  Musculoskeletal: No extremity pain or swelling no calf tenderness or asymmetry  Neurologic: Moving all 4 extremities without difficulty there are no gross focal neurologic deficits   Skin: Warm and dry       DIFFERENTIAL DIAGNOSIS/ MDM:     Fever with abdominal pain. Rule out diverticulitis versus other. IV fluids and labs. Pain control. CT abdomen and pelvis. DIAGNOSTIC RESULTS     EKG: All EKG's are interpreted by the Emergency Department Physician who either signs or Co-signs this chart in the absence of a cardiologist.    936 sinus tachycardia rate 131  QRS 86  no ST elevations. Not indicated unless otherwise documented above    LABS:  Results for orders placed or performed during the hospital encounter of 05/21/22   Culture, Blood 1    Specimen: Blood   Result Value Ref Range    Specimen Description . BLOOD     Special Requests LEFT ANTICUBE 20CC     Culture NO GROWTH <24 HRS    Culture, Blood 1    Specimen: Blood   Result Value Ref Range    Specimen Description . BLOOD     Special Requests RIGHT ANTICUBE 20CC     Culture NO GROWTH <24 HRS    COVID-19, Rapid    Specimen: Nasopharyngeal Swab   Result Value Ref Range    Specimen Description . NASOPHARYNGEAL SWAB     SARS-CoV-2, Rapid Not Detected Not Detected   CBC with Auto Differential   Result Value Ref Range    WBC 18.1 (H) 3.5 - 11.0 k/uL    RBC 5.03 4.5 - 5.9 m/uL    Hemoglobin 15.3 13.5 - 17.5 g/dL    Hematocrit 46.2 41 - 53 %    MCV 91.8 80 - 100 fL    MCH 30.3 26 - 34 pg    MCHC 33.0 31 - 37 g/dL    RDW 14.4 12.5 - 15.4 %    Platelets 309 125 - 519 k/uL    MPV 7.9 6.0 - 12.0 fL    Seg Neutrophils 79 (H) 36 - 66 %    Lymphocytes 5 (L) 24 - 44 %    Monocytes 15 (H) 1 - 7 %    Eosinophils % 0 (L) 1 - 4 %    Basophils 1 0 - 2 %    Segs Absolute 14.29 (H) 1.8 - 7.7 k/uL    Absolute Lymph # 0.91 (L) 1.0 - 4.8 k/uL    Absolute Mono # 2.72 (H) 0.1 - 0.8 k/uL    Absolute Eos # 0.00 0.0 - 0.4 k/uL    Basophils Absolute 0.18 0.0 - 0.2 k/uL    Morphology Normal    Basic Metabolic Panel   Result Value Ref Range    Glucose 196 (H) 70 - 99 mg/dL    BUN 14 8 - 23 mg/dL    CREATININE 0.80 0.70 - 1.20 mg/dL    Calcium 9.3 8.6 - 10.4 mg/dL    Sodium 131 (L) 135 - 144 mmol/L    Potassium 4.1 3.7 - 5.3 mmol/L    Chloride 96 (L) 98 - 107 mmol/L    CO2 22 20 - 31 mmol/L    Anion Gap 13 9 - 17 mmol/L    GFR Non-African American >60 >60 mL/min    GFR African American >60 >60 mL/min    GFR Comment         Amylase   Result Value Ref Range    Amylase 25 (L) 28 - 100 U/L   Hepatic Function Panel   Result Value Ref Range    Albumin 4.1 3.5 - 5.2 g/dL    Alkaline Phosphatase 64 40 - 129 U/L    ALT 19 5 - 41 U/L    AST 11 <40 U/L    Total Bilirubin 1.10 0.3 - 1.2 mg/dL    Bilirubin, Direct 0.31 (H) <0.31 mg/dL    Bilirubin, Indirect 0.79 0.00 - 1.00 mg/dL    Total Protein 7.7 6.4 - 8.3 g/dL    Albumin/Globulin Ratio 1.1 1.0 - 2.5   Lipase   Result Value Ref Range    Lipase 38 13 - 60 U/L   Troponin   Result Value Ref Range    Troponin, High Sensitivity 10 0 - 22 ng/L   Lactate, Sepsis   Result Value Ref Range    Lactic Acid, Sepsis 1.4 0.5 - 1.9 mmol/L Brain Natriuretic Peptide   Result Value Ref Range    Pro-BNP 90 <300 pg/mL       Not indicated unless otherwise documented above    RADIOLOGY:   I reviewed the radiologist interpretations:    CT ABDOMEN PELVIS W IV CONTRAST Additional Contrast? None   Final Result   1. Severe acute diverticulitis/colitis sigmoid colon with the adjacent fat   stranding and regional intraperitoneal free air consistent with local   perforation. No abscess collection at this time. 2. Urinary bladder wall thickening may be underdistention or cystitis which   could be related to adjacent inflammatory change. Motion clinical findings   and urinalysis required. 3. Hepatic steatosis. 4. Nonobstructing right nephrolithiasis. 5. Bilateral probable renal cysts. 6. Prostatomegaly. Critical results were called by Dr. Marlo Drake DO to Dr. Alexandra Garcia   on 5/21/2022 at 11:35. Not indicated unless otherwise documented above    EMERGENCY DEPARTMENT COURSE:     The patient was given the following medications:  Orders Placed This Encounter   Medications    acetaminophen (TYLENOL) tablet 1,000 mg    0.9 % sodium chloride bolus    iopamidol (ISOVUE-370) 76 % injection 75 mL    0.9 % sodium chloride bolus    sodium chloride flush 0.9 % injection 10 mL    piperacillin-tazobactam (ZOSYN) 3,375 mg in dextrose 5 % 50 mL IVPB (mini-bag)     Order Specific Question:   Antimicrobial Indications     Answer:   Infectious Diarrhea     Order Specific Question:   Antimicrobial Indications     Answer:   Intra-Abdominal Infection        Vitals:   -------------------------  BP (!) 138/91   Pulse 106   Temp 100.1 °F (37.8 °C)   Resp 14   Ht 5' 10\" (1.778 m)   Wt 119.7 kg (264 lb)   SpO2 92%   BMI 37.88 kg/m²       11:35 AM elevated white blood cell count. CT of the abdomen pelvis demonstrates diverticulitis with microperforation no abscess. Contacted by radiologist.  Discussed results with patient agreeable to admission. Will talk with hospitalist at [facility]. 1240 PM Discussed with Nayana Chaudhary for Dr Enrique Flanagan agrees to admission. Awaiting bed assignment and transport. We will also discuss with general surgery    1:10 PM discussed with Dr. Sandy Regan agrees to see the patient in consultation. Awaiting bed assignment and transport. Patient resting comfortably    CRITICAL CARE:    None    CONSULTS:    None    PROCEDURES:    None      OARRS Report if indicated             FINAL IMPRESSION      1. Diverticulitis of colon with perforation          DISPOSITION/PLAN   DISPOSITION          CONDITION ON DISPOSITION: STABLE       PATIENT REFERRED TO:  No follow-up provider specified.     DISCHARGE MEDICATIONS:  New Prescriptions    No medications on file       (Please note that portions of this note were completed with a voice recognition program.  Efforts were made to edit the dictations but occasionally words are mis-transcribed.)    Matt Madrigal DO   Attending Emergency Physician      Matt Madrigal DO  05/21/22 7329

## 2022-05-21 NOTE — PROGRESS NOTES
Writer consulted med rec pharm for home medication d/t pt home med list being left in [place] ed. Pt cannot recall medications off hand.

## 2022-05-21 NOTE — PROGRESS NOTES
Transitions of Care Pharmacy Service   Medication Review    The patient's list of current home medications has been updated. Source(s) of information: patient, [facility], [facility] refill report, Care Everywhere      PROVIDER ACTION REQUESTED  Medications that need to be addressed by a physician/nurse practitioner:    Medication Action Requested      Home med list is ready for physician review         Please feel free to call me with any questions about this encounter. Thank you.     Colten Shelton, [facility]   Transitions of Care Pharmacy Service  Phone:  [phone]  Fax: [phone]      Electronically signed by Colten Shelton [facility] on 5/21/2022 at 5:26 PM           Medications Prior to Admission:   albuterol sulfate HFA (VENTOLIN HFA) 108 (90 Base) MCG/ACT inhaler, Inhale 2 puffs into the lungs every 4-6 hours as needed for Wheezing  atorvastatin (LIPITOR) 40 MG tablet, Take 40 mg by mouth daily  vitamin D (CHOLECALCIFEROL) 25 MCG (1000 UT) TABS tablet, Take 1,000 Units by mouth daily  losartan (COZAAR) 50 MG tablet, Take 50 mg by mouth daily  metoprolol tartrate (LOPRESSOR) 25 MG tablet, Take 12.5 mg by mouth daily Takes 1/2 tab (=12.5mg) once daily  pantoprazole (PROTONIX) 40 MG tablet, Take 40 mg by mouth daily  Semaglutide (RYBELSUS) 3 MG TABS, Take 3 mg by mouth daily  Umeclidinium Bromide (INCRUSE ELLIPTA) 62.5 MCG/INH AEPB, Inhale 1 puff into the lungs daily  metFORMIN, OSM, (FORTAMET) 1000 MG extended release tablet, Take 2,000 mg by mouth daily (with breakfast) Takes 2 tabs (=2000mg) once daily  Propylene Glycol (SYSTANE COMPLETE OP), Place 1 drop into both eyes 4 times daily

## 2022-05-21 NOTE — ED NOTES
Pt report called to [facility] RN for room [facility]     Jeramie Jiang RN  05/21/22 1634

## 2022-05-22 LAB
ABSOLUTE EOS #: 0 K/UL (ref 0–0.4)
ABSOLUTE IMMATURE GRANULOCYTE: 0.14 K/UL (ref 0–0.3)
ABSOLUTE LYMPH #: 1.35 K/UL (ref 1–4.8)
ABSOLUTE MONO #: 1.62 K/UL (ref 0.2–0.8)
ALBUMIN SERPL-MCNC: 3.7 G/DL (ref 3.5–5.2)
ALP BLD-CCNC: 64 U/L (ref 40–129)
ALT SERPL-CCNC: 14 U/L (ref 5–41)
ANION GAP SERPL CALCULATED.3IONS-SCNC: 11 MMOL/L (ref 9–17)
AST SERPL-CCNC: 15 U/L
BASOPHILS # BLD: 0 %
BASOPHILS ABSOLUTE: 0 K/UL (ref 0–0.2)
BILIRUB SERPL-MCNC: 0.72 MG/DL (ref 0.3–1.2)
BUN BLDV-MCNC: 16 MG/DL (ref 8–23)
BUN/CREAT BLD: 18 (ref 9–20)
CALCIUM SERPL-MCNC: 8.9 MG/DL (ref 8.6–10.4)
CHLORIDE BLD-SCNC: 101 MMOL/L (ref 98–107)
CO2: 24 MMOL/L (ref 20–31)
CREAT SERPL-MCNC: 0.89 MG/DL (ref 0.7–1.2)
EOSINOPHILS RELATIVE PERCENT: 0 % (ref 1–4)
GFR AFRICAN AMERICAN: >60 ML/MIN
GFR NON-AFRICAN AMERICAN: >60 ML/MIN
GFR SERPL CREATININE-BSD FRML MDRD: ABNORMAL ML/MIN/{1.73_M2}
GLUCOSE BLD-MCNC: 122 MG/DL (ref 75–110)
GLUCOSE BLD-MCNC: 139 MG/DL (ref 70–99)
GLUCOSE BLD-MCNC: 144 MG/DL (ref 75–110)
GLUCOSE BLD-MCNC: 174 MG/DL (ref 75–110)
GLUCOSE BLD-MCNC: 92 MG/DL (ref 75–110)
HCT VFR BLD CALC: 42.8 % (ref 40.7–50.3)
HEMOGLOBIN: 13.9 G/DL (ref 13–17)
IMMATURE GRANULOCYTES: 1 %
LIPASE: 38 U/L (ref 13–60)
LYMPHOCYTES # BLD: 10 % (ref 24–44)
MAGNESIUM: 2.1 MG/DL (ref 1.6–2.6)
MCH RBC QN AUTO: 30.6 PG (ref 25.2–33.5)
MCHC RBC AUTO-ENTMCNC: 32.5 G/DL (ref 28.4–34.8)
MCV RBC AUTO: 94.3 FL (ref 82.6–102.9)
MONOCYTES # BLD: 12 % (ref 1–7)
NRBC AUTOMATED: 0 PER 100 WBC
PDW BLD-RTO: 13.6 % (ref 11.8–14.4)
PHOSPHORUS: 2.6 MG/DL (ref 2.5–4.5)
PLATELET # BLD: 205 K/UL (ref 138–453)
PMV BLD AUTO: 9.5 FL (ref 8.1–13.5)
POTASSIUM SERPL-SCNC: 3.7 MMOL/L (ref 3.7–5.3)
RBC # BLD: 4.54 M/UL (ref 4.21–5.77)
SEG NEUTROPHILS: 77 % (ref 36–66)
SEGMENTED NEUTROPHILS ABSOLUTE COUNT: 10.39 K/UL (ref 1.8–7.7)
SODIUM BLD-SCNC: 136 MMOL/L (ref 135–144)
TOTAL PROTEIN: 7 G/DL (ref 6.4–8.3)
WBC # BLD: 13.5 K/UL (ref 3.5–11.3)

## 2022-05-22 PROCEDURE — 36415 COLL VENOUS BLD VENIPUNCTURE: CPT

## 2022-05-22 PROCEDURE — 2060000000 HC ICU INTERMEDIATE R&B

## 2022-05-22 PROCEDURE — 99232 SBSQ HOSP IP/OBS MODERATE 35: CPT | Performed by: INTERNAL MEDICINE

## 2022-05-22 PROCEDURE — 2500000003 HC RX 250 WO HCPCS: Performed by: NURSE PRACTITIONER

## 2022-05-22 PROCEDURE — 6370000000 HC RX 637 (ALT 250 FOR IP): Performed by: NURSE PRACTITIONER

## 2022-05-22 PROCEDURE — 6360000002 HC RX W HCPCS: Performed by: NURSE PRACTITIONER

## 2022-05-22 PROCEDURE — 80053 COMPREHEN METABOLIC PANEL: CPT

## 2022-05-22 PROCEDURE — 85025 COMPLETE CBC W/AUTO DIFF WBC: CPT

## 2022-05-22 PROCEDURE — 83690 ASSAY OF LIPASE: CPT

## 2022-05-22 PROCEDURE — 83735 ASSAY OF MAGNESIUM: CPT

## 2022-05-22 PROCEDURE — 84100 ASSAY OF PHOSPHORUS: CPT

## 2022-05-22 PROCEDURE — 94640 AIRWAY INHALATION TREATMENT: CPT

## 2022-05-22 PROCEDURE — 2580000003 HC RX 258: Performed by: NURSE PRACTITIONER

## 2022-05-22 PROCEDURE — A4216 STERILE WATER/SALINE, 10 ML: HCPCS | Performed by: NURSE PRACTITIONER

## 2022-05-22 PROCEDURE — 94761 N-INVAS EAR/PLS OXIMETRY MLT: CPT

## 2022-05-22 PROCEDURE — 82947 ASSAY GLUCOSE BLOOD QUANT: CPT

## 2022-05-22 RX ADMIN — SODIUM CHLORIDE, PRESERVATIVE FREE 20 MG: 5 INJECTION INTRAVENOUS at 20:55

## 2022-05-22 RX ADMIN — INSULIN LISPRO 1 UNITS: 100 INJECTION, SOLUTION INTRAVENOUS; SUBCUTANEOUS at 12:37

## 2022-05-22 RX ADMIN — SODIUM CHLORIDE: 9 INJECTION, SOLUTION INTRAVENOUS at 12:36

## 2022-05-22 RX ADMIN — SODIUM CHLORIDE, PRESERVATIVE FREE 10 ML: 5 INJECTION INTRAVENOUS at 09:03

## 2022-05-22 RX ADMIN — SODIUM CHLORIDE: 9 INJECTION, SOLUTION INTRAVENOUS at 22:41

## 2022-05-22 RX ADMIN — METOPROLOL TARTRATE 2.5 MG: 1 INJECTION, SOLUTION INTRAVENOUS at 05:43

## 2022-05-22 RX ADMIN — SODIUM CHLORIDE, PRESERVATIVE FREE 20 MG: 5 INJECTION INTRAVENOUS at 09:01

## 2022-05-22 RX ADMIN — INSULIN LISPRO 1 UNITS: 100 INJECTION, SOLUTION INTRAVENOUS; SUBCUTANEOUS at 08:55

## 2022-05-22 RX ADMIN — TIOTROPIUM BROMIDE INHALATION SPRAY 2 PUFF: 3.12 SPRAY, METERED RESPIRATORY (INHALATION) at 11:05

## 2022-05-22 RX ADMIN — PIPERACILLIN AND TAZOBACTAM 3375 MG: 3; .375 INJECTION, POWDER, LYOPHILIZED, FOR SOLUTION INTRAVENOUS at 09:01

## 2022-05-22 ASSESSMENT — ENCOUNTER SYMPTOMS
DIARRHEA: 0
NAUSEA: 0
SHORTNESS OF BREATH: 0
ABDOMINAL PAIN: 1

## 2022-05-22 ASSESSMENT — PAIN SCALES - GENERAL: PAINLEVEL_OUTOF10: 0

## 2022-05-22 NOTE — PROGRESS NOTES
Physical Therapy  DATE: 2022    NAME: Colletta Banks  MRN: [id number]   : 1955    Patient not seen this date for Physical Therapy due to:      [] Cancel by RN or physician due to:    [] Hemodialysis    [] Critical Lab Value Level     [] Blood transfusion in progress    [] Acute or unstable cardiovascular status   _MAP < 55 or more than >115  _HR < 40 or > 130    [] Acute or unstable pulmonary status   -FiO2 > 60%   _RR < 5 or >40    _O2 sats < 85%    [] Strict Bedrest    [] Off Unit for surgery or procedure    [] Off Unit for testing       [] Pending imaging to R/O fracture    [] Refusal by Patient      [] Other      [x] PT being discontinued at this time. Patient independent. No further needs. [] PT being discontinued at this time as the patient has been transferred to hospice care. No further needs.       Dave Waller, PT

## 2022-05-22 NOTE — FLOWSHEET NOTE
[facility] NOTE    Room # 1012/1012-02   Name: Dionte Garcia              Reason for visit: Routine. I visited the patient. Admit Date & Time: 5/21/2022  9:12 AM    Assessment:  Dionte Garcia is a 79 y.o. male who reports being in the hospital because of \"abdominal pain. \" Upon entering the room patient was sitting chair, watching TV. Intervention:  I introduced myself and my title as  I offered space for patient and family to express feelings, needs, and concerns and provided a ministry presence. Patient engaged in conversation. Gave patient spiritual care business card and offered words of encouragement. Outcome:  Patient expressed gratitude for visit. Plan:  Chaplains will remain available to offer spiritual and emotional support as needed.   Electronically signed by Romeo Villalta on 5/22/2022 at 4:06 PM.  Jovany     05/22/22 1605   Encounter Summary   Service Provided For: Patient   Referral/Consult From: [facility] Unknown   Last Encounter  05/22/22   Complexity of Encounter Low   Begin Time 1500   End Time  1505   Total Time Calculated 5 min   Assessment/Intervention/Outcome   Assessment Calm   Intervention Nurtured Hope   Outcome Engaged in conversation

## 2022-05-22 NOTE — PROGRESS NOTES
General Surgery:  Daily Progress Note          PATIENT NAME: Pradeep Cotton     TODAY'S DATE: 5/22/2022, 8:12 AM    SUBJECTIVE:     Pt seen and examined at bedside. No acute overnight events. States his pain is improved, no nausea or emesis. Passing flatus, no bowel movements. Review of Systems   Constitutional: Negative for chills and fever. Respiratory: Negative for shortness of breath. Cardiovascular: Negative for chest pain. Gastrointestinal: Positive for abdominal pain. Negative for diarrhea and nausea. OBJECTIVE:   VITALS:  /85   Pulse 94   Temp 98.4 °F (36.9 °C) (Oral)   Resp 18   Ht 5' 10\" (1.778 m)   Wt 259 lb 12.8 oz (117.8 kg)   SpO2 94%   BMI 37.28 kg/m²      INTAKE/OUTPUT:      Intake/Output Summary (Last 24 hours) at 5/22/2022 1655  Last data filed at 5/22/2022 4905  Gross per 24 hour   Intake 1844.21 ml   Output --   Net 1844.21 ml       PHYSICAL EXAM:  General Appearance: awake, alert, oriented, in no acute distress  HEENT:  Normocephalic, atraumatic, mucus membranes moist   Heart: Regular rate and rhythm  Lungs: normal effort with symmetric rise and fall of chest wall  Abdomen: Soft, distended, tender to palpation in lower right side, improved, not peritoneal  Extremities: No cyanosis, pitting edema, rashes noted. Skin: Skin color, texture, turgor normal. No rashes or lesions.       Data:  CBC:   Recent Labs     05/21/22  0940 05/22/22  0641   WBC 18.1* 13.5*   HGB 15.3 13.9    205     Chemistry:   Recent Labs     05/21/22  0940 05/22/22  0641   * 136   K 4.1 3.7   CL 96* 101   CO2 22 24   GLUCOSE 196* 139*   BUN 14 16   CREATININE 0.80 0.89   MG  --  2.1   ANIONGAP 13 11   LABGLOM >60 >60   GFRAA >60 >60   CALCIUM 9.3 8.9   PHOS  --  2.6   PROBNP 90  --    TROPHS 10  --      Hepatic:   Recent Labs     05/21/22  0940 05/22/22  0641   AST 11 15   ALT 19 14   ALKPHOS 64 64   BILITOT 1.10 0.72   BILIDIR 0.31*  --      Coagulation:   Recent Labs No acute osseous abnormality. 1. Severe acute diverticulitis/colitis sigmoid colon with the adjacent fat stranding and regional intraperitoneal free air consistent with local perforation. No abscess collection at this time. 2. Urinary bladder wall thickening may be underdistention or cystitis which could be related to adjacent inflammatory change. Motion clinical findings and urinalysis required. 3. Hepatic steatosis. 4. Nonobstructing right nephrolithiasis. 5. Bilateral probable renal cysts. 6. Prostatomegaly. Critical results were called by Dr. Nadiya Parikh DO to Dr. Herbert Gonzalez on 5/21/2022 at 11:35. ASSESSMENT:  Active Hospital Problems    Diagnosis Date Noted    Diverticulitis of colon with perforation [K57.20] 05/21/2022     Priority: Medium    CAD (coronary artery disease) [I25.10] 05/21/2022     Priority: Medium    Hypertension [I10] 05/21/2022     Priority: Medium    Type 2 diabetes mellitus, without long-term current use of insulin ([facility] [address] [E11.9] 05/21/2022     Priority: Medium    Hyperlipidemia [E78.5] 05/21/2022     Priority: Medium    RUT (obstructive sleep apnea) [G47.33] 10/02/2017     Priority: Medium    Chronic obstructive lung disease ([facility] [address] [J44.9] 05/06/2014     Priority: Medium       1. 79 y.o. male with acute sigmoid diverticulitis with microperforation      Plan:  1. Continue medical management and supportive care per primary team  2. Continue conservative management at this time. Patient's abdominal pain is improving as well as his leukocytosis from 18.1-13.5  3. Advance diet to clear liquids  4. Continue IV Zosyn  5. Continue to monitor for change in exam which would prompt acute surgical intervention  6. We will continue to follow    Electronically signed by Nithya Summers DO  on 5/22/2022 at 8:12 AM   Attending Physician Statement  I have discussed the case, including pertinent history and exam findings with the resident.  I agree with the assessment, plan and orders as documented by the resident. Feeling better with less abdominal pain  WBC improved  Continue IV antibiotics .  Start Clear liquids

## 2022-05-22 NOTE — PLAN OF CARE
Problem: Discharge Planning  Goal: Discharge to home or other facility with appropriate resources  Outcome: Progressing  Flowsheets  Taken 5/21/2022 2000 by Norbert Vargas RN  Discharge to home or other facility with appropriate resources:   Identify barriers to discharge with patient and caregiver   Arrange for needed discharge resources and transportation as appropriate   Identify discharge learning needs (meds, wound care, etc)   Refer to discharge planning if patient needs post-hospital services based on physician order or complex needs related to functional status, cognitive ability or social support system  Taken 5/21/2022 1711 by Jose Eduardo Elaine RN  Discharge to home or other facility with appropriate resources:   Identify barriers to discharge with patient and caregiver   Arrange for needed discharge resources and transportation as appropriate   Identify discharge learning needs (meds, wound care, etc)   Arrange for interpreters to assist at discharge as needed   Refer to discharge planning if patient needs post-hospital services based on physician order or complex needs related to functional status, cognitive ability or social support system     Problem: ABCDS Injury Assessment  Goal: Absence of physical injury  Outcome: Progressing  Flowsheets (Taken 5/21/2022 2300)  Absence of Physical Injury: Implement safety measures based on patient assessment

## 2022-05-22 NOTE — PROGRESS NOTES
[facility]  Office: [address], DO, Jasmine Sharif, DO, Bal Boyd, DO, Umangdudley Red Blood, DO, Adarsh Ku MD, Boo Garcia MD, Gerardo Bruno MD, Priscilla Wong MD, Marbella Washburn MD, Gianna Coello MD, Lissette Hollingsworth MD, Morena Collins, DO, Abdon Evans, DO, Palak Liao MD,  Tyree Morgan, DO, Fer Cardenas MD, Corina Burgos MD, Jordan Leary MD, Jens Chambers DO, Lurdes Martinez MD, Gracy Kong MD, Sirena Adorno MD, Martyjennifer Valladares [facility], [facility], CNP, Luan Flores, CNP, Fabiola Gonzalez, CNP, Heriberto Monahan, CNP, Evens Pagan, CNP, Rell Nails PA-C, Sahara Warren, [facility], Mikel Hill, [facility], Ward Carreno, CNP, Marbella Leon, CNP, Mely Cruz, CNS, Sherron Espino, [facility], Joaquim Glass, CNP, Pal Cooepr, CNP, Darlene Leal, [facility]    Progress Note    5/22/2022    10:06 AM    Name:   Joaquina Muñoz  MRN:     [id number]     Acct:      [de-identified]   Room:   [address] [place] [address] Day:  1  Admit Date:  5/21/2022  9:12 AM    PCP:   Radha Frank MD  Code Status:  Full Code    Subjective:     C/C:   Chief Complaint   Patient presents with    Abdominal Pain     3  days        Interval History Status: not changed. Patient seen and examined, no issues overnight, patient is hungry however has not been cleared for diet by general surgery. Discussed transition to diet. Patient with no questions    Brief History:     Joaquina Muñoz is a 79 y.o. Non- / non  male who presents with Abdominal Pain (3  days   )   and is admitted to the hospital for the management of Diverticulitis of colon with perforation.     Presented to the ER with a 3-day complaint of abdominal pain and cramping. He denies nausea, vomiting chest pain or shortness of breath. He denies a fever at home but he presented with a temp of 100.3.   He states he has had episodes similar to this in the last year or 2 but they have been self-limiting. He states he had an EGD a couple years ago and that he has a remote history of an EGD and colonoscopy around 10 years ago. He also has a history of open heart surgery in 2018 and right lung cancer both followed by a physician at [facility]. Patient denies a history of chemotherapy or radiation he did have right sided lung resection. He also has a history of COPD and RUT but he is not compliant with CPAP. Today CT showed severe acute diverticulitis/colitis sigmoid colon with the adjacent fat stranding and regional intraperitoneal free air consistent with local perforation. No abscess or collection is seen. Patient was given fluids and started on Zosyn. General surgery following. Per Dr. Mcgraw Ast hold anticoagulation for now okay for ice chips/sips of clears. Review of Systems:     Constitutional:  negative for chills, fevers, sweats  Respiratory:  negative for cough, dyspnea on exertion, shortness of breath, wheezing  Cardiovascular:  negative for chest pain, chest pressure/discomfort, lower extremity edema, palpitations  Gastrointestinal:  negative for abdominal pain, constipation, diarrhea, nausea, vomiting  Neurological:  negative for dizziness, headache    Medications: Allergies:     Allergies   Allergen Reactions    Cat Hair Extract     Shrimp (Diagnostic)        Current Meds:   Scheduled Meds:    sodium chloride  70 mL IntraVENous Once    sodium chloride flush  5-40 mL IntraVENous 2 times per day    insulin lispro  0-6 Units SubCUTAneous TID WC    insulin lispro  0-3 Units SubCUTAneous Nightly    piperacillin-tazobactam  3,375 mg IntraVENous Q8H    famotidine (PEPCID) injection  20 mg IntraVENous BID    tiotropium  2 puff Inhalation Daily     Continuous Infusions:    sodium chloride      sodium chloride 125 mL/hr at 05/22/22 0637    dextrose       PRN Meds: sodium chloride flush, sodium chloride, potassium chloride **OR** potassium alternative oral replacement **OR** potassium chloride, magnesium sulfate, ondansetron, polyethylene glycol, morphine, glucose, dextrose bolus **OR** dextrose bolus, glucagon (rDNA), dextrose, albuterol sulfate HFA    Data:     Past Medical History:   has a past medical history of CAD (coronary artery disease), Diabetes mellitus ([address] History of quadruple bypass, Hyperlipidemia, and Hypertension. Social History:   reports that he quit smoking about 7 years ago. He has a 72.00 pack-year smoking history. He has never used smokeless tobacco. He reports that he does not drink alcohol and does not use drugs. Family History:   Family History   Problem Relation Age of Onset    Alzheimer's Disease Mother     Heart Attack Father        Vitals:  /85   Pulse 94   Temp 98.4 °F (36.9 °C) (Oral)   Resp 18   Ht 5' 10\" (1.778 m)   Wt 259 lb 12.8 oz (117.8 kg)   SpO2 94%   BMI 37.28 kg/m²   Temp (24hrs), Av.8 °F (37.1 °C), Min:98.1 °F (36.7 °C), Max:100.1 °F (37.8 °C)    Recent Labs     22  1749 22  1944 22  0801   POCGLU 115* 145* 144*       I/O (24Hr):     Intake/Output Summary (Last 24 hours) at 2022 1006  Last data filed at 2022 3566  Gross per 24 hour   Intake 1844.21 ml   Output --   Net 1844.21 ml       Labs:  Hematology:  Recent Labs     22  0940 22  0641   WBC 18.1* 13.5*   RBC 5.03 4.54   HGB 15.3 13.9   HCT 46.2 42.8   MCV 91.8 94.3   MCH 30.3 30.6   MCHC 33.0 32.5   RDW 14.4 13.6    205   MPV 7.9 9.5     Chemistry:  Recent Labs     22  0940 22  0641   * 136   K 4.1 3.7   CL 96* 101   CO2 22 24   GLUCOSE 196* 139*   BUN 14 16   CREATININE 0.80 0.89   MG  --  2.1   ANIONGAP 13 11   LABGLOM >60 >60   GFRAA >60 >60   CALCIUM 9.3 8.9   PHOS  --  2.6   PROBNP 90  --    TROPHS 10  --      Recent Labs     22  0940 22  1749 22  1944 22  0641 22  0801   PROT 7.7  --   --  7.0  --    LABALBU 4.1  --   --  3.7  --    AST 11  --   --  15  --    ALT 19  --   --  14  --    ALKPHOS 64  --   --  64  --    BILITOT 1.10  --   --  0.72  --    BILIDIR 0.31*  --   --   --   --    AMYLASE 25*  --   --   --   --    LIPASE 38  --   --  38  --    POCGLU  --  115* 145*  --  144*     ABG:No results found for: POCPH, PHART, PH, POCPCO2, KYC6LDX, PCO2, POCPO2, PO2ART, PO2, POCHCO3, AAS0SNW, HCO3, NBEA, PBEA, BEART, BE, THGBART, THB, DXP7FBS, KEFX1TDD, M5YQMPCV, O2SAT, FIO2  Lab Results   Component Value Date/Time    [facility] 05/21/2022 09:50 AM     Lab Results   Component Value Date/Time    CULTURE NO GROWTH 12 HOURS 05/21/2022 09:50 AM       Radiology:  CT ABDOMEN PELVIS W IV CONTRAST Additional Contrast? None    Result Date: 5/21/2022  1. Severe acute diverticulitis/colitis sigmoid colon with the adjacent fat stranding and regional intraperitoneal free air consistent with local perforation. No abscess collection at this time. 2. Urinary bladder wall thickening may be underdistention or cystitis which could be related to adjacent inflammatory change. Motion clinical findings and urinalysis required. 3. Hepatic steatosis. 4. Nonobstructing right nephrolithiasis. 5. Bilateral probable renal cysts. 6. Prostatomegaly. Critical results were called by Dr. Codey Prado DO to Dr. Margo Barnes on 5/21/2022 at 11:35.        Physical Examination:        General appearance:  alert, cooperative and no distress  Mental Status:  oriented to person, place and time and normal affect  Lungs:  clear to auscultation bilaterally, normal effort  Heart:  regular rate and rhythm, no murmur  Abdomen:  soft, nontender, nondistended, normal bowel sounds, no masses, hepatomegaly, splenomegaly  Extremities:  no edema, redness, tenderness in the calves  Skin:  no gross lesions, rashes, induration    Assessment:        Hospital Problems           Last Modified POA    * (Principal) Diverticulitis of colon with perforation 5/21/2022 Yes    Type 2 diabetes mellitus, without long-term current use of insulin ([facility] 5/21/2022 Yes    CAD (coronary artery disease) 5/21/2022 Yes    Hyperlipidemia 5/21/2022 Yes    Hypertension 5/21/2022 Yes    Chronic obstructive lung disease ([facility] 5/21/2022 Yes    RUT (obstructive sleep apnea) 5/21/2022 Yes    Overview Signed 5/21/2022  6:01 PM by YOLY John - CNP     Non compliance                Plan:        Patient still on liquid diet, awaiting clearance for general surgery to advance diet  Poor historian, unclear why patient is on metoprolol, thinks he is on a blood thinner but does not seem to remember what it is. No records of blood thinners from care everywhere as well as our charts.   Discontinue metoprolol, patient is normal sinus currently    Radha Agosto DO  5/22/2022  10:06 AM

## 2022-05-22 NOTE — PLAN OF CARE
Problem: Pain  Goal: Verbalizes/displays adequate comfort level or baseline comfort level  Outcome: Progressing     Problem: ABCDS Injury Assessment  Goal: Absence of physical injury  Outcome: Progressing     Problem: Chronic Conditions and Co-morbidities  Goal: Patient's chronic conditions and co-morbidity symptoms are monitored and maintained or improved  Outcome: Progressing

## 2022-05-23 PROBLEM — K57.80 PERFORATED DIVERTICULUM: Status: ACTIVE | Noted: 2022-05-23

## 2022-05-23 LAB
ALBUMIN SERPL-MCNC: 3.3 G/DL (ref 3.5–5.2)
ANION GAP SERPL CALCULATED.3IONS-SCNC: 10 MMOL/L (ref 9–17)
BUN BLDV-MCNC: 12 MG/DL (ref 8–23)
BUN/CREAT BLD: 14 (ref 9–20)
CALCIUM SERPL-MCNC: 8.7 MG/DL (ref 8.6–10.4)
CHLORIDE BLD-SCNC: 102 MMOL/L (ref 98–107)
CO2: 27 MMOL/L (ref 20–31)
CREAT SERPL-MCNC: 0.85 MG/DL (ref 0.7–1.2)
EKG ATRIAL RATE: 131 BPM
EKG P AXIS: 27 DEGREES
EKG P-R INTERVAL: 140 MS
EKG Q-T INTERVAL: 292 MS
EKG QRS DURATION: 86 MS
EKG QTC CALCULATION (BAZETT): 431 MS
EKG R AXIS: 70 DEGREES
EKG T AXIS: 45 DEGREES
EKG VENTRICULAR RATE: 131 BPM
GFR AFRICAN AMERICAN: >60 ML/MIN
GFR NON-AFRICAN AMERICAN: >60 ML/MIN
GFR SERPL CREATININE-BSD FRML MDRD: ABNORMAL ML/MIN/{1.73_M2}
GLUCOSE BLD-MCNC: 127 MG/DL (ref 70–99)
GLUCOSE BLD-MCNC: 129 MG/DL (ref 75–110)
GLUCOSE BLD-MCNC: 132 MG/DL (ref 75–110)
GLUCOSE BLD-MCNC: 94 MG/DL (ref 75–110)
GLUCOSE BLD-MCNC: 97 MG/DL (ref 75–110)
HCT VFR BLD CALC: 41 % (ref 40.7–50.3)
HEMOGLOBIN: 13.2 G/DL (ref 13–17)
MAGNESIUM: 2.2 MG/DL (ref 1.6–2.6)
MCH RBC QN AUTO: 30.5 PG (ref 25.2–33.5)
MCHC RBC AUTO-ENTMCNC: 32.2 G/DL (ref 28.4–34.8)
MCV RBC AUTO: 94.7 FL (ref 82.6–102.9)
NRBC AUTOMATED: 0 PER 100 WBC
PDW BLD-RTO: 13.2 % (ref 11.8–14.4)
PHOSPHORUS: 3.2 MG/DL (ref 2.5–4.5)
PLATELET # BLD: 209 K/UL (ref 138–453)
PMV BLD AUTO: 9.4 FL (ref 8.1–13.5)
POTASSIUM SERPL-SCNC: 3.9 MMOL/L (ref 3.7–5.3)
RBC # BLD: 4.33 M/UL (ref 4.21–5.77)
SODIUM BLD-SCNC: 139 MMOL/L (ref 135–144)
WBC # BLD: 9.4 K/UL (ref 3.5–11.3)

## 2022-05-23 PROCEDURE — 99231 SBSQ HOSP IP/OBS SF/LOW 25: CPT | Performed by: INTERNAL MEDICINE

## 2022-05-23 PROCEDURE — 83735 ASSAY OF MAGNESIUM: CPT

## 2022-05-23 PROCEDURE — 94640 AIRWAY INHALATION TREATMENT: CPT

## 2022-05-23 PROCEDURE — 36415 COLL VENOUS BLD VENIPUNCTURE: CPT

## 2022-05-23 PROCEDURE — 2500000003 HC RX 250 WO HCPCS: Performed by: NURSE PRACTITIONER

## 2022-05-23 PROCEDURE — 6360000002 HC RX W HCPCS: Performed by: NURSE PRACTITIONER

## 2022-05-23 PROCEDURE — 85027 COMPLETE CBC AUTOMATED: CPT

## 2022-05-23 PROCEDURE — 6370000000 HC RX 637 (ALT 250 FOR IP): Performed by: INTERNAL MEDICINE

## 2022-05-23 PROCEDURE — 2060000000 HC ICU INTERMEDIATE R&B

## 2022-05-23 PROCEDURE — 2580000003 HC RX 258: Performed by: NURSE PRACTITIONER

## 2022-05-23 PROCEDURE — 82947 ASSAY GLUCOSE BLOOD QUANT: CPT

## 2022-05-23 PROCEDURE — A4216 STERILE WATER/SALINE, 10 ML: HCPCS | Performed by: NURSE PRACTITIONER

## 2022-05-23 PROCEDURE — 80069 RENAL FUNCTION PANEL: CPT

## 2022-05-23 RX ORDER — ACETAMINOPHEN 325 MG/1
650 TABLET ORAL EVERY 4 HOURS PRN
Status: DISCONTINUED | OUTPATIENT
Start: 2022-05-23 | End: 2022-05-24 | Stop reason: HOSPADM

## 2022-05-23 RX ORDER — FAMOTIDINE 20 MG/1
20 TABLET, FILM COATED ORAL 2 TIMES DAILY
Status: DISCONTINUED | OUTPATIENT
Start: 2022-05-24 | End: 2022-05-24 | Stop reason: HOSPADM

## 2022-05-23 RX ADMIN — PIPERACILLIN AND TAZOBACTAM 3375 MG: 3; .375 INJECTION, POWDER, LYOPHILIZED, FOR SOLUTION INTRAVENOUS at 17:41

## 2022-05-23 RX ADMIN — PIPERACILLIN AND TAZOBACTAM 3375 MG: 3; .375 INJECTION, POWDER, LYOPHILIZED, FOR SOLUTION INTRAVENOUS at 01:45

## 2022-05-23 RX ADMIN — ACETAMINOPHEN 650 MG: 325 TABLET ORAL at 08:16

## 2022-05-23 RX ADMIN — SODIUM CHLORIDE, PRESERVATIVE FREE 20 MG: 5 INJECTION INTRAVENOUS at 08:47

## 2022-05-23 RX ADMIN — PIPERACILLIN AND TAZOBACTAM 3375 MG: 3; .375 INJECTION, POWDER, LYOPHILIZED, FOR SOLUTION INTRAVENOUS at 09:54

## 2022-05-23 RX ADMIN — SODIUM CHLORIDE, PRESERVATIVE FREE 20 MG: 5 INJECTION INTRAVENOUS at 20:30

## 2022-05-23 RX ADMIN — TIOTROPIUM BROMIDE INHALATION SPRAY 2 PUFF: 3.12 SPRAY, METERED RESPIRATORY (INHALATION) at 09:07

## 2022-05-23 ASSESSMENT — ENCOUNTER SYMPTOMS
SHORTNESS OF BREATH: 0
EYE DISCHARGE: 0
ABDOMINAL PAIN: 1
NAUSEA: 0
CHEST TIGHTNESS: 0
DIARRHEA: 0

## 2022-05-23 ASSESSMENT — PAIN SCALES - GENERAL: PAINLEVEL_OUTOF10: 6

## 2022-05-23 NOTE — PROGRESS NOTES
[facility]  Office: [address], DO, Edmondcelia Enriquez, DO, Flakita Nixon, DO, Sandor Orrhaydee Blood, DO, Cheng Gutiérrez MD, Preston Mares MD, Lauren Siu MD, Beth Yancey MD, Del Watters MD, Evita Baltazar MD, Sujata Marroquin MD, Salinas Felipe, DO, Katarina Benites, DO, Arya Jimenez MD,  Elgin Mcgee, DO, Roberta Baugh MD, Manuela Chatterjee MD, Paulina Nolan MD, Etelvina Daniels DO, Jefry Sierra MD, Madison Waite MD, Rola Medina MD, Peterson Mitchell, [facility], [facility], [facility], Carmelina Pasthayes, CNP, Marisabel Ponce, CNP, Priscilla Conde, CNP, Astrid Whiting, CNP, MICHEL Mendez-C, Noman Haney, [facility], Eric Joshi, CNP, Lauri Sue, CNP, Farhan Garrett, CNP, Jcarlos Foster, CNS, Deyanira Morillo, [facility], Climmiaudie Points, CNP, Rasheed Boehringer, CNP, Raffy Corrales, [facility]    Progress Note    5/23/2022    1:00 PM    Name:   Natacha Pina  MRN:     [id number]     Acct:      [de-identified]   Room:   [facility]02   Day:  2  Admit Date:  5/21/2022  9:12 AM    PCP:   Abner Vásquez MD  Code Status:  Full Code    Subjective:     C/C:   Chief Complaint   Patient presents with    Abdominal Pain     3  days        Interval History Status: not changed. Patient seen and examined, no issues overnight. Diet was advanced to full    Brief History:     Natacha Pina is a 79 y.o. Non- / non  male who presents with Abdominal Pain (3  days   )   and is admitted to the hospital for the management of Diverticulitis of colon with perforation.     Presented to the ER with a 3-day complaint of abdominal pain and cramping. He denies nausea, vomiting chest pain or shortness of breath. He denies a fever at home but he presented with a temp of 100.3. He states he has had episodes similar to this in the last year or 2 but they have been self-limiting.   He states he had an EGD a couple years ago and that he has a remote history of an EGD and colonoscopy around 10 years ago. He also has a history of open heart surgery in 2018 and right lung cancer both followed by a physician at [facility]. Patient denies a history of chemotherapy or radiation he did have right sided lung resection. He also has a history of COPD and RUT but he is not compliant with CPAP. Today CT showed severe acute diverticulitis/colitis sigmoid colon with the adjacent fat stranding and regional intraperitoneal free air consistent with local perforation. No abscess or collection is seen. Patient was given fluids and started on Zosyn. General surgery following. Per Dr. Bennie Manriquez hold anticoagulation for now okay for ice chips/sips of clears. Patient has NG tube in, awaiting clearance for advancement of diet. Patient otherwise stable    Review of Systems:     Constitutional:  negative for chills, fevers, sweats  Respiratory:  negative for cough, dyspnea on exertion, shortness of breath, wheezing  Cardiovascular:  negative for chest pain, chest pressure/discomfort, lower extremity edema, palpitations  Gastrointestinal:  negative for abdominal pain, constipation, diarrhea, nausea, vomiting  Neurological:  negative for dizziness, headache    Medications: Allergies:     Allergies   Allergen Reactions    Cat Hair Extract     Shrimp (Diagnostic)        Current Meds:   Scheduled Meds:    sodium chloride  70 mL IntraVENous Once    sodium chloride flush  5-40 mL IntraVENous 2 times per day    insulin lispro  0-6 Units SubCUTAneous TID WC    insulin lispro  0-3 Units SubCUTAneous Nightly    piperacillin-tazobactam  3,375 mg IntraVENous Q8H    famotidine (PEPCID) injection  20 mg IntraVENous BID    tiotropium  2 puff Inhalation Daily     Continuous Infusions:    sodium chloride      dextrose       PRN Meds: acetaminophen, sodium chloride flush, sodium chloride, potassium chloride **OR** potassium alternative oral replacement **OR** potassium chloride, magnesium sulfate, ondansetron, polyethylene glycol, morphine, glucose, dextrose bolus **OR** dextrose bolus, glucagon (rDNA), dextrose, albuterol sulfate HFA    Data:     Past Medical History:   has a past medical history of CAD (coronary artery disease), Diabetes mellitus ([address] History of quadruple bypass, Hyperlipidemia, and Hypertension. Social History:   reports that he quit smoking about 7 years ago. He has a 72.00 pack-year smoking history. He has never used smokeless tobacco. He reports that he does not drink alcohol and does not use drugs. Family History:   Family History   Problem Relation Age of Onset    Alzheimer's Disease Mother     Heart Attack Father        Vitals:  /82   Pulse 85   Temp 98.4 °F (36.9 °C) (Oral)   Resp 16   Ht 5' 10\" (1.778 m)   Wt (!) 302 lb 2 oz (137 kg)   SpO2 94%   BMI 43.35 kg/m²   Temp (24hrs), Av.6 °F (37 °C), Min:98.1 °F (36.7 °C), Max:99.3 °F (37.4 °C)    Recent Labs     22  1140 22  1652 22  2049 22  0819   POCGLU 174* 92 122* 132*       I/O (24Hr):     Intake/Output Summary (Last 24 hours) at 2022 1300  Last data filed at 2022 0406  Gross per 24 hour   Intake 2406.32 ml   Output --   Net 2406.32 ml       Labs:  Hematology:  Recent Labs     22  0940 22  0641 22  0536   WBC 18.1* 13.5* 9.4   RBC 5.03 4.54 4.33   HGB 15.3 13.9 13.2   HCT 46.2 42.8 41.0   MCV 91.8 94.3 94.7   MCH 30.3 30.6 30.5   MCHC 33.0 32.5 32.2   RDW 14.4 13.6 13.2    205 209   MPV 7.9 9.5 9.4     Chemistry:  Recent Labs     22  0940 22  0641 22  0536   * 136 139   K 4.1 3.7 3.9   CL 96* 101 102   CO2 22 24 27   GLUCOSE 196* 139* 127*   BUN 14 16 12   CREATININE 0.80 0.89 0.85   MG  --  2.1 2.2   ANIONGAP 13 11 10   LABGLOM >60 >60 >60   GFRAA >60 >60 >60   CALCIUM 9.3 8.9 8.7   PHOS  --  2.6 3.2   PROBNP 90  --   --    TROPHS 10  --   --      Recent Labs     22  0940 22  1749 22  1944 22  8745 05/22/22  0801 05/22/22  1140 05/22/22  1652 05/22/22  2049 05/23/22  0536 05/23/22  0819   PROT 7.7  --   --  7.0  --   --   --   --   --   --    LABALBU 4.1  --   --  3.7  --   --   --   --  3.3*  --    AST 11  --   --  15  --   --   --   --   --   --    ALT 19  --   --  14  --   --   --   --   --   --    ALKPHOS 64  --   --  64  --   --   --   --   --   --    BILITOT 1.10  --   --  0.72  --   --   --   --   --   --    BILIDIR 0.31*  --   --   --   --   --   --   --   --   --    AMYLASE 25*  --   --   --   --   --   --   --   --   --    LIPASE 38  --   --  38  --   --   --   --   --   --    POCGLU  --    < > 145*  --  144* 174* 92 122*  --  132*    < > = values in this interval not displayed. ABG:No results found for: POCPH, PHART, PH, POCPCO2, MFO8CWS, PCO2, POCPO2, PO2ART, PO2, POCHCO3, FIM2EJO, HCO3, NBEA, PBEA, BEART, BE, THGBART, THB, QJP0YPP, COSG2HZR, K4HDNTDS, O2SAT, FIO2  Lab Results   Component Value Date/Time    [facility] 05/21/2022 09:50 AM     Lab Results   Component Value Date/Time    CULTURE NO GROWTH 2 DAYS 05/21/2022 09:50 AM       Radiology:  CT ABDOMEN PELVIS W IV CONTRAST Additional Contrast? None    Result Date: 5/21/2022  1. Severe acute diverticulitis/colitis sigmoid colon with the adjacent fat stranding and regional intraperitoneal free air consistent with local perforation. No abscess collection at this time. 2. Urinary bladder wall thickening may be underdistention or cystitis which could be related to adjacent inflammatory change. Motion clinical findings and urinalysis required. 3. Hepatic steatosis. 4. Nonobstructing right nephrolithiasis. 5. Bilateral probable renal cysts. 6. Prostatomegaly. Critical results were called by Dr. Ryan Cordova DO to Dr. Enriqueta Gutiérrez on 5/21/2022 at 11:35.        Physical Examination:        General appearance:  alert, cooperative and no distress  Mental Status:  oriented to person, place and time and normal affect  Lungs:  clear to auscultation bilaterally, normal effort  Heart:  regular rate and rhythm, no murmur  Abdomen:  soft, nontender, nondistended, normal bowel sounds, no masses, hepatomegaly, splenomegaly  Extremities:  no edema, redness, tenderness in the calves  Skin:  no gross lesions, rashes, induration    Assessment:        Hospital Problems           Last Modified POA    * (Principal) Diverticulitis of colon with perforation 5/21/2022 Yes    Type 2 diabetes mellitus, without long-term current use of insulin ([facility] 5/21/2022 Yes    CAD (coronary artery disease) 5/21/2022 Yes    Hyperlipidemia 5/21/2022 Yes    Hypertension 5/21/2022 Yes    Chronic obstructive lung disease ([facility] 5/21/2022 Yes    RUT (obstructive sleep apnea) 5/21/2022 Yes    Overview Signed 5/21/2022  6:01 PM by YOLY Kidd CNP     Non compliance          Perforated diverticulum 5/23/2022 Yes          Plan:        Patient still on liquid diet, advance to full liquid diet  Patient poor historian, metoprolol was discontinued due to borderline hypotension, patient has no history of atrial fibrillation    Dada Alfonso,   5/23/2022  1:00 PM

## 2022-05-23 NOTE — PROGRESS NOTES
Physician Progress Note      PATIENT:               Tristen Fu  CSN #:                  [id number]  :                       1955  ADMIT DATE:       2022 9:12 AM  DISCH DATE:  Yuridia Borja  PROVIDER #:        Sarah SCHAEFER          QUERY TEXT:    Pt admitted with diverticulitis with micro perforation. Pt noted to have   elevated WBC, tachycardia. If possible, please document in the progress notes   and discharge summary if you are evaluating and /or treating any of the   following: The medical record reflects the following:  Risk Factors: diverticulitis with micro perforation  Clinical Indicators: WBC  18.1-> 13.5-> 9.4; T max 37.9, ST up to   129  Treatment: GS consulted, IV Zosyn, BC drawn- pending  Options provided:  -- Sepsis, present on admission  -- Sepsis was ruled out  -- Other - I will add my own diagnosis  -- Disagree - Not applicable / Not valid  -- Disagree - Clinically unable to determine / Unknown  -- Refer to Clinical Documentation Reviewer    PROVIDER RESPONSE TEXT:    After further study, sepsis was ruled out for this patient.     Query created by: Denisse Cha on 2022 11:21 AM      Electronically signed by:  Sarah SCHAEFER 2022 11:52 AM

## 2022-05-23 NOTE — PROGRESS NOTES
Occupational Therapy  DATE: 2022    NAME: Seng Jaramillo  MRN: [id number]   : 1955    Patient not seen this date for Occupational Therapy due to:      [] Cancel by RN or physician due to:    [] Hemodialysis    [] Critical Lab Value Level     [] Blood transfusion in progress    [] Acute or unstable cardiovascular status   _MAP < 55 or more than >115  _HR < 40 or > 130    [] Acute or unstable pulmonary status   -FiO2 > 60%   _RR < 5 or >40    _O2 sats < 85%    [] Strict Bedrest    [] Off Unit for surgery or procedure    [] Off Unit for testing       [] Pending imaging to R/O fracture    [] Refusal by Patient      [x] Other- PT reports pt is I and has no OT needs; DC OT order      [] PT being discontinued at this time. Patient independent. No further needs. [] PT being discontinued at this time as the patient has been transferred to hospice care. No further needs.       Pham Roach, OT

## 2022-05-23 NOTE — PLAN OF CARE
Problem: ABCDS Injury Assessment  Goal: Absence of physical injury  Outcome: Progressing     Patient has remained free from falls this shift. Patient is alert and oriented times four. Bed to lowest position with door open. Patient care items and call light in reach. Patient uses call light appropriately for assist. Will continue to monitor. Please see fall assessment.

## 2022-05-23 NOTE — ACP (ADVANCE CARE PLANNING)
Advance Care Planning     Advance Care Planning Activator (Inpatient)  Conversation Note      Date of ACP Conversation: 5/23/2022     Conversation Conducted with: Patient with Decision Making Capacity    ACP Activator: Teresa Arias RN        Health Care Decision Maker:     Current Designated Health Care Decision Maker:     Primary Decision Maker: Frandy Bragg - Marielle - [phone]  Click here to complete Healthcare Decision Makers including section of the Healthcare Decision Maker Relationship (ie \"Primary\")  Today we no documentation but per [facility], sons will be medical POA. Care Preferences    Ventilation: \"If you were in your present state of health and suddenly became very ill and were unable to breathe on your own, what would your preference be about the use of a ventilator (breathing machine) if it were available to you? \"      Would the patient desire the use of ventilator (breathing machine)?: yes    \"If your health worsens and it becomes clear that your chance of recovery is unlikely, what would your preference be about the use of a ventilator (breathing machine) if it were available to you? \"     Would the patient desire the use of ventilator (breathing machine)?: Yes      Resuscitation  \"CPR works best to restart the heart when there is a sudden event, like a heart attack, in someone who is otherwise healthy. Unfortunately, CPR does not typically restart the heart for people who have serious health conditions or who are very sick. \"    \"In the event your heart stopped as a result of an underlying serious health condition, would you want attempts to be made to restart your heart (answer \"yes\" for attempt to resuscitate) or would you prefer a natural death (answer \"no\" for do not attempt to resuscitate)? \" yes       [x] Yes   [] No   Educated Patient / Margarita Briceno regarding differences between Advance Directives and portable DNR orders.     Length of ACP Conversation in minutes: Conversation Outcomes:  [x] ACP discussion completed  [] Existing advance directive reviewed with patient; no changes to patient's previously recorded wishes  [] New Advance Directive completed  [] Portable Do Not Rescitate prepared for Provider review and signature  [] POLST/POST/MOLST/MOST prepared for Provider review and signature      Follow-up plan:    [] Schedule follow-up conversation to continue planning  [] Referred individual to Provider for additional questions/concerns   [x] Advised patient/agent/surrogate to review completed ACP document and update if needed with changes in condition, patient preferences or care setting    [] This note routed to one or more involved healthcare providers

## 2022-05-23 NOTE — CARE COORDINATION
Case Management Initial Discharge Plan  Minnie White,         Readmission Risk              Risk of Unplanned Readmission:  14             Met with:patient to discuss discharge plans. Information verified: address, contacts, phone number, , insurance Yes  PCP: Deirdre Cole MD  Date of last visit:     Insurance Provider: Andrea Allen     Discharge Planning  Current Residence:  Private home   Living Arrangements:  [address] [facility] has 2 stories/1 stairs to climb to enter. Bed and bath are both downstairs. Support Systems:  Children     Current Services PTA:  None  Agency: none      Patient able to perform ADL's:Independent  DME in home:  cpap but does not wear and canes   DME used to aid ambulation prior to admission:   none  DME used during admission:  none    Potential Assistance Needed:  N/A    Pharmacy: [facility] In [place]    Potential Assistance Purchasing Medications:  No  Does patient want to participate in local refill/ meds to beds program?  Yes    Patient agreeable to home care: No  [place] of choice provided:  n/a      Type of Home Care Services:  None  Patient expects to be discharged to:   home     Prior SNF/Rehab Placement and Facility: none  Agreeable to SNF/Rehab: No  [place] of choice provided: n/a   Evaluation: n/a    Expected Discharge date:      Follow Up Appointment: Best Day/ Time: Monday PM    Transportation provider: per family  Transportation arrangements needed for discharge: No    Discharge Plan:   Met with patient to complete discharge assessment. Patient lives at home and very independent. Still drives. Has cane does not use. Has cpap and does not use. Patient had lobectomy due to cancer and was in rehab and home care but unsure of names of facility/agency    Patient admitted with diverticulitis. Generral surgery Is following. At this time conservative treatment to be done. No anticipated needs but will follow.      Electronically signed by Crystal Will Davian Madrid on 5/23/22 at 2:37 PM EDT

## 2022-05-24 VITALS
WEIGHT: 262.5 LBS | HEART RATE: 90 BPM | RESPIRATION RATE: 14 BRPM | DIASTOLIC BLOOD PRESSURE: 72 MMHG | TEMPERATURE: 98.6 F | SYSTOLIC BLOOD PRESSURE: 122 MMHG | HEIGHT: 70 IN | OXYGEN SATURATION: 94 % | BODY MASS INDEX: 37.58 KG/M2

## 2022-05-24 LAB
ALBUMIN SERPL-MCNC: 3.2 G/DL (ref 3.5–5.2)
ANION GAP SERPL CALCULATED.3IONS-SCNC: 8 MMOL/L (ref 9–17)
BUN BLDV-MCNC: 11 MG/DL (ref 8–23)
BUN/CREAT BLD: 12 (ref 9–20)
CALCIUM SERPL-MCNC: 8.9 MG/DL (ref 8.6–10.4)
CHLORIDE BLD-SCNC: 103 MMOL/L (ref 98–107)
CO2: 29 MMOL/L (ref 20–31)
CREAT SERPL-MCNC: 0.9 MG/DL (ref 0.7–1.2)
GFR AFRICAN AMERICAN: >60 ML/MIN
GFR NON-AFRICAN AMERICAN: >60 ML/MIN
GFR SERPL CREATININE-BSD FRML MDRD: ABNORMAL ML/MIN/{1.73_M2}
GLUCOSE BLD-MCNC: 125 MG/DL (ref 70–99)
GLUCOSE BLD-MCNC: 154 MG/DL (ref 75–110)
HCT VFR BLD CALC: 39.5 % (ref 40.7–50.3)
HEMOGLOBIN: 12.9 G/DL (ref 13–17)
MAGNESIUM: 2.1 MG/DL (ref 1.6–2.6)
MCH RBC QN AUTO: 30.6 PG (ref 25.2–33.5)
MCHC RBC AUTO-ENTMCNC: 32.7 G/DL (ref 28.4–34.8)
MCV RBC AUTO: 93.8 FL (ref 82.6–102.9)
NRBC AUTOMATED: 0 PER 100 WBC
PDW BLD-RTO: 13.3 % (ref 11.8–14.4)
PHOSPHORUS: 4.1 MG/DL (ref 2.5–4.5)
PLATELET # BLD: 237 K/UL (ref 138–453)
PMV BLD AUTO: 9.4 FL (ref 8.1–13.5)
POTASSIUM SERPL-SCNC: 3.7 MMOL/L (ref 3.7–5.3)
RBC # BLD: 4.21 M/UL (ref 4.21–5.77)
SODIUM BLD-SCNC: 140 MMOL/L (ref 135–144)
WBC # BLD: 8.8 K/UL (ref 3.5–11.3)

## 2022-05-24 PROCEDURE — 94761 N-INVAS EAR/PLS OXIMETRY MLT: CPT

## 2022-05-24 PROCEDURE — 36415 COLL VENOUS BLD VENIPUNCTURE: CPT

## 2022-05-24 PROCEDURE — 80069 RENAL FUNCTION PANEL: CPT

## 2022-05-24 PROCEDURE — 2580000003 HC RX 258: Performed by: NURSE PRACTITIONER

## 2022-05-24 PROCEDURE — 83735 ASSAY OF MAGNESIUM: CPT

## 2022-05-24 PROCEDURE — 94640 AIRWAY INHALATION TREATMENT: CPT

## 2022-05-24 PROCEDURE — 85027 COMPLETE CBC AUTOMATED: CPT

## 2022-05-24 PROCEDURE — 99238 HOSP IP/OBS DSCHRG MGMT 30/<: CPT | Performed by: INTERNAL MEDICINE

## 2022-05-24 PROCEDURE — 6370000000 HC RX 637 (ALT 250 FOR IP): Performed by: NURSE PRACTITIONER

## 2022-05-24 PROCEDURE — 82947 ASSAY GLUCOSE BLOOD QUANT: CPT

## 2022-05-24 PROCEDURE — 6360000002 HC RX W HCPCS: Performed by: NURSE PRACTITIONER

## 2022-05-24 RX ORDER — AMOXICILLIN AND CLAVULANATE POTASSIUM 875; 125 MG/1; MG/1
1 TABLET, FILM COATED ORAL 2 TIMES DAILY
Qty: 23 TABLET | Refills: 0 | Status: SHIPPED | OUTPATIENT
Start: 2022-05-24 | End: 2022-06-05

## 2022-05-24 RX ORDER — GREEN TEA/HOODIA GORDONII 315-12.5MG
1 CAPSULE ORAL 2 TIMES DAILY
Qty: 60 TABLET | Refills: 0 | COMMUNITY
Start: 2022-05-24 | End: 2022-06-07

## 2022-05-24 RX ADMIN — SODIUM CHLORIDE 10 ML/HR: 9 INJECTION, SOLUTION INTRAVENOUS at 02:15

## 2022-05-24 RX ADMIN — PIPERACILLIN AND TAZOBACTAM 3375 MG: 3; .375 INJECTION, POWDER, LYOPHILIZED, FOR SOLUTION INTRAVENOUS at 02:17

## 2022-05-24 RX ADMIN — FAMOTIDINE 20 MG: 20 TABLET, FILM COATED ORAL at 08:59

## 2022-05-24 RX ADMIN — TIOTROPIUM BROMIDE INHALATION SPRAY 2 PUFF: 3.12 SPRAY, METERED RESPIRATORY (INHALATION) at 09:45

## 2022-05-24 ASSESSMENT — ENCOUNTER SYMPTOMS
DIARRHEA: 0
WHEEZING: 0
ABDOMINAL PAIN: 1
COUGH: 0
NAUSEA: 0
EYE DISCHARGE: 0

## 2022-05-24 NOTE — DISCHARGE SUMMARY
[facility]  Office: [address], DO, Jeffrey Samson, DO, Shin Saucedo, DO, Abe Freddie Bourgeois, DO, Radha Sanchez MD, Tung Mcneal MD, Abner Dalton MD, Aldo Parks MD, Jered Barraza MD, Jean-Claude Pleitez MD, Daphney Gaspar MD, Abhinav Garcia, DO, Lani Starkey DO, David Ram MD,  Alma Aguilera DO, Merry Hooker MD, Stacey Alegre MD, Primo Tejada MD, Em Boyce DO, Andres Singh MD, Bhupendra Fagan MD, Clarence Arellano MD, [facility], [facility], Maria Victoria Alida, CNP, Gretchen Ash, CNP, Michael Parsons, CNP, Susan Flores, CNP, Catheryn Leventhal, PA-C, Deena Gramajo, [facility], Danika Chaudhary, CNP, Shea Hankins, CNP, Goldy Graff, CNP, Mckayla Harper, CNS, June Hernandez, [facility], Cindy Salcedo, CNP, Horace Ward, CNP, Ortiz Knight, [facility]    Discharge Summary     Patient ID: Shasta Malcolm  :  1955   MRN: [id number]     ACCOUNT:  [de-identified]   Patient's PCP: Juan F Garcia MD  Admit Date: 2022   Discharge Date: 2022     Length of Stay: 3  Code Status:  Full Code  Admitting Physician: Lani Starkey DO  Discharge Physician: Estrada Bourgeois DO     Active Discharge Diagnoses:     Hospital Problem Lists:  Principal Problem:    Diverticulitis of colon with perforation  Active Problems:    Type 2 diabetes mellitus, without long-term current use of insulin (HCC)    CAD (coronary artery disease)    Hyperlipidemia    Hypertension    Chronic obstructive lung disease (HCC)    RUT (obstructive sleep apnea)    Perforated diverticulum  Resolved Problems:    * No resolved hospital problems.  *      Admission Condition:  fair     Discharged Condition: stable    Hospital Stay:     Hospital Course:  Shasta Malcolm is a 79 y.o. male who was admitted for the management of  Diverticulitis of colon with perforation , presented to ER with Abdominal Pain (3  days   )    Admitted with abd pain and found to have diverticulitis with perforation, seen by gen surg and placed on iv zosyn. Initially bowel rest then diet advanced as chava. abd pain subsided and infection improved. Will go home on 14 days total antibiotics per surgery recs. Also added probiotics during the course of antibiotics      Significant therapeutic interventions: see above    Significant Diagnostic Studies:   Labs / Micro:  CBC:   Lab Results   Component Value Date    WBC 8.8 05/24/2022    RBC 4.21 05/24/2022    HGB 12.9 05/24/2022    HCT 39.5 05/24/2022    MCV 93.8 05/24/2022    MCH 30.6 05/24/2022    MCHC 32.7 05/24/2022    RDW 13.3 05/24/2022     05/24/2022     CMP:    Lab Results   Component Value Date    GLUCOSE 125 05/24/2022     05/24/2022    K 3.7 05/24/2022     05/24/2022    CO2 29 05/24/2022    BUN 11 05/24/2022    CREATININE 0.90 05/24/2022    ANIONGAP 8 05/24/2022    ALKPHOS 64 05/22/2022    ALT 14 05/22/2022    AST 15 05/22/2022    BILITOT 0.72 05/22/2022    LABALBU 3.2 05/24/2022    ALBUMIN 1.1 05/21/2022    LABGLOM >60 05/24/2022    GFRAA >60 05/24/2022    GFR      05/24/2022    PROT 7.0 05/22/2022    CALCIUM 8.9 05/24/2022        Radiology:  CT ABDOMEN PELVIS W IV CONTRAST Additional Contrast? None    Result Date: 5/21/2022  1. Severe acute diverticulitis/colitis sigmoid colon with the adjacent fat stranding and regional intraperitoneal free air consistent with local perforation. No abscess collection at this time. 2. Urinary bladder wall thickening may be underdistention or cystitis which could be related to adjacent inflammatory change. Motion clinical findings and urinalysis required. 3. Hepatic steatosis. 4. Nonobstructing right nephrolithiasis. 5. Bilateral probable renal cysts. 6. Prostatomegaly. Critical results were called by Dr. Geovany Cobos DO to Dr. Salomón Montano on 5/21/2022 at 11:35.        Consultations:    Consults:     Final Specialist Recommendations/Findings:   IP CONSULT TO GENERAL SURGERY The patient was seen and examined on day of discharge and this discharge summary is in conjunction with any daily progress note from day of discharge.     Discharge plan:     Disposition: Home    Physician Follow Up:     Augustin Earl MD  [address] [place] [address]  [phone]    In 1 week  Evaluation of diverticulitis     Jake Monzon MD  [address] [place] [id number] [phone]    In 1 week         Requiring Further Evaluation/Follow Up POST HOSPITALIZATION/Incidental Findings: diverticulitis    Diet: diabetic diet    Activity: As tolerated    Instructions to Patient: take medications as prescribed      Discharge Medications:      Medication List      START taking these medications    amoxicillin-clavulanate 875-125 MG per tablet  Commonly known as: AUGMENTIN  Take 1 tablet by mouth 2 times daily for 23 doses     Probiotic Acidophilus Tabs  Take 1 tablet by mouth 2 times daily for 14 days        CONTINUE taking these medications    atorvastatin 40 MG tablet  Commonly known as: LIPITOR     Incruse Ellipta 62.5 MCG/INH Aepb  Generic drug: Umeclidinium Bromide     losartan 50 MG tablet  Commonly known as: COZAAR     metFORMIN (OSM) 1000 MG extended release tablet  Commonly known as: FORTAMET     metoprolol tartrate 25 MG tablet  Commonly known as: LOPRESSOR     pantoprazole 40 MG tablet  Commonly known as: PROTONIX     Rybelsus 3 MG Tabs  Generic drug: Semaglutide     SYSTANE COMPLETE OP     Ventolin  (90 Base) MCG/ACT inhaler  Generic drug: albuterol sulfate HFA     vitamin D 25 MCG (1000 UT) Tabs tablet  Commonly known as: CHOLECALCIFEROL           Where to Get Your Medications      These medications were sent to [facility] [address],  [address] [place] [address]    Phone: [phone]   amoxicillin-clavulanate 875-125 MG per tablet     You can get these medications from any pharmacy    You don't need a prescription for these medications  Probiotic Acidophilus Tabs         No discharge procedures on file. Time Spent on discharge is  20 mins in patient examination, evaluation, counseling as well as medication reconciliation, prescriptions for required medications, discharge plan and follow up. Electronically signed by   Bret Bourgeois DO  5/24/2022  8:43 AM      Thank you Dr. Marshal Diaz MD for the opportunity to be involved in this patient's care.

## 2022-05-24 NOTE — PROGRESS NOTES
General Surgery:  Daily Progress Note          PATIENT NAME: Cathy Martinez     TODAY'S DATE: 5/24/2022, 5:35 AM    SUBJECTIVE:     Patient was seen and evaluated bedside. No acute vents overnight. Afebrile vital signs within normal limits. Patient tolerated low fiber diet. Complains of minimal abdominal pain. Denies nausea or vomiting    Review of Systems   Constitutional: Negative for fatigue and fever. HENT: Negative for ear pain. Eyes: Negative for discharge. Respiratory: Negative for cough and wheezing. Cardiovascular: Negative for chest pain. Gastrointestinal: Positive for abdominal pain. Negative for diarrhea and nausea. Genitourinary: Negative for difficulty urinating. Neurological: Negative for dizziness and headaches. OBJECTIVE:   VITALS:  /82   Pulse 88   Temp 98.4 °F (36.9 °C) (Oral)   Resp 18   Ht 5' 10\" (1.778 m)   Wt (!) 302 lb 2 oz (137 kg)   SpO2 95%   BMI 43.35 kg/m²      INTAKE/OUTPUT:      Intake/Output Summary (Last 24 hours) at 5/24/2022 0535  Last data filed at 5/24/2022 0150  Gross per 24 hour   Intake 4422.49 ml   Output --   Net 4422.49 ml       PHYSICAL EXAM:  General Appearance: awake, alert, oriented, in no acute distress  HEENT:  Normocephalic, atraumatic, mucus membranes moist   Heart: Regular rate and rhythm  Lungs: Normal chest wall expansion with respirations  Abdomen: Soft, mildly distended, tenderness to palpation left lower quadrant, no rebound, no peritoneal signs  Extremities: No signs of edema or rash  Skin: Skin color, texture, turgor normal. No rashes or lesions.       Data:  CBC:   Recent Labs     05/21/22  0940 05/22/22  0641 05/23/22  0536   WBC 18.1* 13.5* 9.4   HGB 15.3 13.9 13.2    205 209     Chemistry:   Recent Labs     05/21/22  0940 05/22/22  0641 05/23/22  0536   * 136 139   K 4.1 3.7 3.9   CL 96* 101 102   CO2 22 24 27   GLUCOSE 196* 139* 127*   BUN 14 16 12   CREATININE 0.80 0.89 0.85   MG  --  2.1 2.2   ANIONGAP 13 11 10   LABGLOM >60 >60 >60   GFRAA >60 >60 >60   CALCIUM 9.3 8.9 8.7   PHOS  --  2.6 3.2   PROBNP 90  --   --    TROPHS 10  --   --      Hepatic:   Recent Labs     05/21/22  0940 05/22/22  0641   AST 11 15   ALT 19 14   ALKPHOS 64 64   BILITOT 1.10 0.72   BILIDIR 0.31*  --      Coagulation:   Recent Labs     05/21/22  0940 05/22/22  0641   PROT 7.7 7.0         Radiology Review:    No new imaging to review      ASSESSMENT:  Active Hospital Problems    Diagnosis Date Noted    Perforated diverticulum [K57.80] 05/23/2022     Priority: Medium    Diverticulitis of colon with perforation [K57.20] 05/21/2022     Priority: Medium    CAD (coronary artery disease) [I25.10] 05/21/2022     Priority: Medium    Hypertension [I10] 05/21/2022     Priority: Medium    Type 2 diabetes mellitus, without long-term current use of insulin ([facility] [address] [E11.9] 05/21/2022     Priority: Medium    Hyperlipidemia [E78.5] 05/21/2022     Priority: Medium    RUT (obstructive sleep apnea) [G47.33] 10/02/2017     Priority: Medium    Chronic obstructive lung disease ([facility] [address] [J44.9] 05/06/2014     Priority: Medium       79 y.o. male with acute sigmoid diverticulitis with microperforation      Plan:  Continue medical management and supportive care per primary team  Patient overall clinically improved. From a surgery standpoint patient appropriate for discharge on p.o. antibiotics for total 14 days of antibiotic coverage. Follow-up with  as outpatient. Encourage low fiber diet    Electronically signed by Terry Kauffman MD  on 5/24/2022 at 5:35 AM     Attending Physician Statement  I have discussed the case, including pertinent history and exam findings with the resident. I agree with the assessment, plan and orders as documented by the resident. Clinically improving.   Oral antibiotics at discharge and follow up with Dr. Tomás Fournier

## 2022-05-24 NOTE — PROGRESS NOTES
Pt discharged to home by car with family, pt read and understood discharge instructions, pt will follow up with pcp and Dr. Harley Marquez, pt picked up probiotic and abx from [facility] pharmacy, no home care needed, pt discharged in stable condition

## 2022-05-24 NOTE — PROGRESS NOTES
[facility]  Office: [address], DO, Chauncey Dupree, DO, Jayme Jolley, DO, Pau Maldonado Blood, DO, Ale Nur MD, Anastacio Rouse MD, Jose Alberto Smith MD, Mine Rodriguez MD, Almas Thurston MD, Colette Guzman MD, Israel Jolley MD, Beryl De Santiago, DO, Yaa Graham, DO, Shola Rodrigez MD,  Rachel Gee, DO, Maegan Pozo MD, Bobby Goldsmith MD, Geo Arauz MD, Mariano [facility], DO, Damián Patino MD, Tracie Cha MD, Micky Alberts MD, [facility]u [facility], [facility], CNP, Ab Mckeon, CNP, Duane Perez, CNP, Karina Motta, CNP, John Garcia, CNP, Adonay Hill PA-C, Vikram Fragoso [facility], Pao Oconnell, CNP, Triston Dyer, CNP, Vivian Grewal, CNP, Nydia Rdz, CNS, Manuela Gil, [facility], Breann [facility], CNP, Supriya Ge, CNP, Jesica Esquivel, [facility]    Progress Note    5/24/2022    8:37 AM    Name:   Pradeep Cotton  MRN:     [id number]     Acct:      [de-identified]   Room:   [address] [place] [address] Day:  3  Admit Date:  5/21/2022  9:12 AM    PCP:   Antonia Wright MD  Code Status:  Full Code    Subjective:     C/C:   Chief Complaint   Patient presents with    Abdominal Pain     3  days        Interval History Status: improved. Feels good  Denies cp/sob/n/v/abd pain  Not needing pain meds    Brief History:     Per my partner:   Nesha [place] a 79 y.o. Non- / non  male who presents with Abdominal Pain (3  days   )   and is admitted to the hospital for the management of Diverticulitis of colon with perforation.     Presented to the ER with a 3-day complaint of abdominal pain and cramping.  He denies nausea, vomiting chest pain or shortness of breath.  He denies a fever at home but he presented with a temp of 100.3.  He states he has had episodes similar to this in the last year or 2 but they have been self-limiting.  He states he had an EGD a couple years ago and that he has a remote history of an EGD and colonoscopy around 10 years ago.  He also has a history of open heart surgery in 2018 and right lung cancer both followed by a physician [facility] denies a history of chemotherapy or radiation he did have right sided lung resection.  He also has a history of COPD and RUT but he is not compliant with CPAP.  Today CT showed severe acute diverticulitis/colitis sigmoid colon with the adjacent fat stranding and regional intraperitoneal free air consistent with local perforation.  No abscess or collection is seen. Maisha Leonardo was given fluids and started on Zosyn.  General surgery following. \"    Review of Systems:     Constitutional:  negative for chills, fevers, sweats  Respiratory:  negative for cough, dyspnea on exertion, shortness of breath, wheezing  Cardiovascular:  negative for chest pain, chest pressure/discomfort, lower extremity edema, palpitations  Gastrointestinal:  negative for abdominal pain, constipation, diarrhea, nausea, vomiting  Neurological:  negative for dizziness, headache    Medications: Allergies:     Allergies   Allergen Reactions    Cat Hair Extract     Shrimp (Diagnostic)        Current Meds:   Scheduled Meds:    famotidine  20 mg Oral BID    sodium chloride  70 mL IntraVENous Once    sodium chloride flush  5-40 mL IntraVENous 2 times per day    insulin lispro  0-6 Units SubCUTAneous TID WC    insulin lispro  0-3 Units SubCUTAneous Nightly    piperacillin-tazobactam  3,375 mg IntraVENous Q8H    tiotropium  2 puff Inhalation Daily     Continuous Infusions:    sodium chloride 10 mL/hr (05/24/22 0215)    dextrose       PRN Meds: acetaminophen, sodium chloride flush, sodium chloride, potassium chloride **OR** potassium alternative oral replacement **OR** potassium chloride, magnesium sulfate, ondansetron, polyethylene glycol, morphine, glucose, dextrose bolus **OR** dextrose bolus, glucagon (rDNA), dextrose, albuterol sulfate HFA    Data:     Past Medical History:   has a past medical history of CAD (coronary artery disease), Diabetes mellitus ([address] History of quadruple bypass, Hyperlipidemia, and Hypertension. Social History:   reports that he quit smoking about 7 years ago. He has a 72.00 pack-year smoking history. He has never used smokeless tobacco. He reports that he does not drink alcohol and does not use drugs. Family History:   Family History   Problem Relation Age of Onset    Alzheimer's Disease Mother     Heart Attack Father        Vitals:  /72   Pulse 90   Temp 98.6 °F (37 °C) (Oral)   Resp 14   Ht 5' 10\" (1.778 m)   Wt 262 lb 8 oz (119.1 kg)   SpO2 92%   BMI 37.66 kg/m²   Temp (24hrs), Av.5 °F (36.9 °C), Min:98.2 °F (36.8 °C), Max:99 °F (37.2 °C)    Recent Labs     22  1258 22  1711 22  0755   POCGLU 97 94 129* 154*       I/O (24Hr): Intake/Output Summary (Last 24 hours) at 2022 0837  Last data filed at 2022 0150  Gross per 24 hour   Intake 2016.17 ml   Output --   Net 2016.17 ml       Labs:  Hematology:  Recent Labs     22  0641 22  0536 22  0456   WBC 13.5* 9.4 8.8   RBC 4.54 4.33 4.21   HGB 13.9 13.2 12.9*   HCT 42.8 41.0 39.5*   MCV 94.3 94.7 93.8   MCH 30.6 30.5 30.6   MCHC 32.5 32.2 32.7   RDW 13.6 13.2 13.3    209 237   MPV 9.5 9.4 9.4     Chemistry:  Recent Labs     22  0940 22  0940 22  0641 22  0536 22  0456   *   < > 136 139 140   K 4.1   < > 3.7 3.9 3.7   CL 96*   < > 101 102 103   CO2 22   < > 24 27 29   GLUCOSE 196*   < > 139* 127* 125*   BUN 14   < > 16 12 11   CREATININE 0.80   < > 0.89 0.85 0.90   MG  --   --  2.1 2.2 2.1   ANIONGAP 13   < > 11 10 8*   LABGLOM >60   < > >60 >60 >60   GFRAA >60   < > >60 >60 >60   CALCIUM 9.3   < > 8.9 8.7 8.9   PHOS  --   --  2.6 3.2 4.1   PROBNP 90  --   --   --   --    TROPHS 10  --   --   --   --     < > = values in this interval not displayed.      Recent Labs     22  0916 05/21/22  1749 05/22/22  3732 05/22/22  0801 05/22/22  2049 05/23/22  0536 05/23/22  0819 05/23/22  1258 05/23/22  1711 05/23/22 2022 05/24/22  0456 05/24/22  0755   PROT 7.7  --  7.0  --   --   --   --   --   --   --   --   --    LABALBU 4.1  --  3.7  --   --  3.3*  --   --   --   --  3.2*  --    AST 11  --  15  --   --   --   --   --   --   --   --   --    ALT 19  --  14  --   --   --   --   --   --   --   --   --    ALKPHOS 64  --  64  --   --   --   --   --   --   --   --   --    BILITOT 1.10  --  0.72  --   --   --   --   --   --   --   --   --    BILIDIR 0.31*  --   --   --   --   --   --   --   --   --   --   --    AMYLASE 25*  --   --   --   --   --   --   --   --   --   --   --    LIPASE 38  --  38  --   --   --   --   --   --   --   --   --    POCGLU  --    < >  --    < > 122*  --  132* 97 94 129*  --  154*    < > = values in this interval not displayed. ABG:No results found for: POCPH, PHART, PH, POCPCO2, HCS5ISJ, PCO2, POCPO2, PO2ART, PO2, POCHCO3, IEZ4OWX, HCO3, NBEA, PBEA, BEART, BE, THGBART, THB, JPP4YIE, EUWL4YGE, U7SJUFMY, O2SAT, FIO2  Lab Results   Component Value Date/Time    [facility] 05/21/2022 09:50 AM     Lab Results   Component Value Date/Time    CULTURE NO GROWTH 2 DAYS 05/21/2022 09:50 AM       Radiology:  CT ABDOMEN PELVIS W IV CONTRAST Additional Contrast? None    Result Date: 5/21/2022  1. Severe acute diverticulitis/colitis sigmoid colon with the adjacent fat stranding and regional intraperitoneal free air consistent with local perforation. No abscess collection at this time. 2. Urinary bladder wall thickening may be underdistention or cystitis which could be related to adjacent inflammatory change. Motion clinical findings and urinalysis required. 3. Hepatic steatosis. 4. Nonobstructing right nephrolithiasis. 5. Bilateral probable renal cysts. 6. Prostatomegaly. Critical results were called by Dr. Lizzeth Lange DO to Dr. Patricia Kim on 5/21/2022 at 11:35. Physical Examination:        General appearance:  alert, cooperative and no distress  Mental Status:  oriented to person, place and time and normal affect  Lungs:  clear to auscultation bilaterally, normal effort  Heart:  regular rate and rhythm, no murmur  Abdomen:  soft, nontender, nondistended, normal bowel sounds, no masses, hepatomegaly, splenomegaly; obese  Extremities:  no edema, redness, tenderness in the calves  Skin:  no gross lesions, rashes, induration    Assessment:        Hospital Problems           Last Modified POA    * (Principal) Diverticulitis of colon with perforation 5/21/2022 Yes    Type 2 diabetes mellitus, without long-term current use of insulin ([facility] [address] 5/21/2022 Yes    CAD (coronary artery disease) 5/21/2022 Yes    Hyperlipidemia 5/21/2022 Yes    Hypertension 5/21/2022 Yes    Chronic obstructive lung disease ([facility] [address] 5/21/2022 Yes    RUT (obstructive sleep apnea) 5/21/2022 Yes    Overview Signed 5/21/2022  6:01 PM by YOLY Teixeira CNP     Non compliance          Perforated diverticulum 5/23/2022 Yes          Plan:        Dc home on po augmentin  Probiotics for next couple weeks  F/u surgery and pcp    Marvin Hu Blood, DO  5/24/2022  8:37 AM

## 2022-05-24 NOTE — PLAN OF CARE
Problem: Pain  Goal: Verbalizes/displays adequate comfort level or baseline comfort level  Outcome: Progressing     Problem: Chronic Conditions and Co-morbidities  Goal: Patient's chronic conditions and co-morbidity symptoms are monitored and maintained or improved  Outcome: Progressing  Flowsheets (Taken 5/23/2022 1957)  Care Plan - Patient's Chronic Conditions and Co-Morbidity Symptoms are Monitored and Maintained or Improved: Monitor and assess patient's chronic conditions and comorbid symptoms for stability, deterioration, or improvement     Problem: Gastrointestinal - Adult  Goal: Minimal or absence of nausea and vomiting  Outcome: Progressing     Problem: Discharge Planning  Goal: Discharge to home or other facility with appropriate resources  Recent Flowsheet Documentation  Taken 5/23/2022 1957 by Aneudy Clements  Discharge to home or other facility with appropriate resources: Identify barriers to discharge with patient and caregiver

## 2022-05-26 LAB
CULTURE: NORMAL
CULTURE: NORMAL
Lab: NORMAL
Lab: NORMAL
SPECIMEN DESCRIPTION: NORMAL
SPECIMEN DESCRIPTION: NORMAL

## 2022-08-09 LAB
ABSOLUTE IMMATURE GRANULOCYTE: 0 10*3/UL (ref 0–0.2)
ALBUMIN: 4.3 G/DL (ref 3.5–5.7)
ALP BLD-CCNC: 69 IU/L (ref 34–104)
ALT SERPL-CCNC: 38 IU/L (ref 7–52)
AST SERPL-CCNC: 25 IU/L (ref 13–39)
BASOPHILS ABSOLUTE: 0.1 10*3/UL (ref 0–0.2)
BASOPHILS RELATIVE PERCENT: 0.9 % (ref 0–1)
BILIRUB SERPL-MCNC: 0.4 MG/DL (ref 0.3–1)
BUN BLDV-MCNC: 16 MG/DL (ref 7–25)
CALCIUM SERPL-MCNC: 9.2 MG/DL (ref 8.6–10.3)
CHLORIDE BLD-SCNC: 106 MEQ/L (ref 98–107)
CO2: 27 MEQ/L (ref 21–31)
CREAT SERPL-MCNC: 0.83 MG/DL (ref 0.7–1.3)
EGFR IF NONAFRICAN AMERICAN: >60 ML/MIN/1.73SQ M
EOSINOPHILS ABSOLUTE: 0.1 10*3/UL (ref 0–0.5)
EOSINOPHILS RELATIVE PERCENT: 2.1 % (ref 0–6)
GLUCOSE: 102 MG/DL (ref 70–100)
HCT VFR BLD CALC: 43.6 % (ref 39–50)
HEMOGLOBIN: 15.1 G/DL (ref 13–17)
IMMATURE GRANULOCYTES: 0.3 % (ref 0–1)
LYMPHOCYTES ABSOLUTE: 1.8 10*3/UL (ref 1.2–4)
LYMPHOCYTES RELATIVE PERCENT: 27.7 % (ref 20–45)
MCH RBC QN AUTO: 31.3 PG (ref 27–33)
MCHC RBC AUTO-ENTMCNC: 34.6 G/DL (ref 32–35)
MCV RBC AUTO: 90.3 FL (ref 82–98)
MONOCYTES ABSOLUTE: 0.8 10*3/UL (ref 0.1–1)
MONOCYTES RELATIVE PERCENT: 12.2 % (ref 5–12)
NEUTROPHILS ABSOLUTE: 3.6 10*3/UL (ref 1.6–7.6)
NEUTROPHILS RELATIVE PERCENT: 56.8 % (ref 40–72)
NUCLEATED RED BLOOD CELLS: 0 % (ref 0–0)
PDW BLD-RTO: 13.9 % (ref 11.5–15)
PLATELET # BLD: 215 10*3/UL (ref 150–400)
POTASSIUM SERPL-SCNC: 4.4 MEQ/L (ref 3.5–5.1)
PROTEIN TOTAL: 6.7 G/DL (ref 6–8.3)
RBC: 4.83 10*6/UL (ref 4.2–5.7)
SODIUM BLD-SCNC: 139 MEQ/L (ref 136–145)
WBC: 6.32 10*3/UL (ref 4–10.6)

## 2022-12-04 ENCOUNTER — APPOINTMENT (OUTPATIENT)
Dept: CT IMAGING | Facility: CLINIC | Age: 67
End: 2022-12-04
Payer: MEDICARE

## 2022-12-04 ENCOUNTER — HOSPITAL ENCOUNTER (EMERGENCY)
Facility: CLINIC | Age: 67
Discharge: HOME | End: 2022-12-04
Attending: EMERGENCY MEDICINE
Payer: MEDICARE

## 2022-12-04 VITALS
BODY MASS INDEX: 35.87 KG/M2 | SYSTOLIC BLOOD PRESSURE: 135 MMHG | HEART RATE: 103 BPM | DIASTOLIC BLOOD PRESSURE: 90 MMHG | WEIGHT: 250 LBS | RESPIRATION RATE: 20 BRPM | TEMPERATURE: 98.2 F | OXYGEN SATURATION: 94 %

## 2022-12-04 DIAGNOSIS — J18.9 PNEUMONIA OF RIGHT LOWER LOBE DUE TO INFECTIOUS ORGANISM: Primary | ICD-10-CM

## 2022-12-04 LAB
ABSOLUTE EOS #: 0.1 K/UL (ref 0–0.4)
ABSOLUTE LYMPH #: 1.4 K/UL (ref 1–4.8)
ABSOLUTE MONO #: 0.9 K/UL (ref 0.1–1.2)
ANION GAP SERPL CALCULATED.3IONS-SCNC: 9 MMOL/L (ref 9–17)
BASOPHILS # BLD: 1 % (ref 0–2)
BASOPHILS ABSOLUTE: 0 K/UL (ref 0–0.2)
BUN BLDV-MCNC: 14 MG/DL (ref 8–23)
CALCIUM SERPL-MCNC: 9.8 MG/DL (ref 8.6–10.4)
CHLORIDE BLD-SCNC: 98 MMOL/L (ref 98–107)
CO2: 31 MMOL/L (ref 20–31)
CREAT SERPL-MCNC: 0.8 MG/DL (ref 0.7–1.2)
EOSINOPHILS RELATIVE PERCENT: 1 % (ref 1–4)
FLU A ANTIGEN: NEGATIVE
FLU B ANTIGEN: NEGATIVE
GFR SERPL CREATININE-BSD FRML MDRD: >60 ML/MIN/1.73M2
GLUCOSE BLD-MCNC: 91 MG/DL (ref 70–99)
HCT VFR BLD CALC: 45.4 % (ref 41–53)
HEMOGLOBIN: 15.4 G/DL (ref 13.5–17.5)
LYMPHOCYTES # BLD: 14 % (ref 24–44)
MCH RBC QN AUTO: 30.6 PG (ref 26–34)
MCHC RBC AUTO-ENTMCNC: 34 G/DL (ref 31–37)
MCV RBC AUTO: 90.1 FL (ref 80–100)
MONOCYTES # BLD: 9 % (ref 2–11)
PDW BLD-RTO: 14.5 % (ref 12.5–15.4)
PLATELET # BLD: 345 K/UL (ref 140–450)
PMV BLD AUTO: 6.5 FL (ref 6–12)
POTASSIUM SERPL-SCNC: 4 MMOL/L (ref 3.7–5.3)
RBC # BLD: 5.03 M/UL (ref 4.5–5.9)
SARS-COV-2, RAPID: NOT DETECTED
SEG NEUTROPHILS: 75 % (ref 36–66)
SEGMENTED NEUTROPHILS ABSOLUTE COUNT: 7.3 K/UL (ref 1.8–7.7)
SODIUM BLD-SCNC: 138 MMOL/L (ref 135–144)
SPECIMEN DESCRIPTION: NORMAL
TROPONIN, HIGH SENSITIVITY: 14 NG/L (ref 0–22)
WBC # BLD: 9.8 K/UL (ref 3.5–11)

## 2022-12-04 PROCEDURE — 71260 CT THORAX DX C+: CPT | Performed by: REGISTERED NURSE

## 2022-12-04 PROCEDURE — 6370000000 HC RX 637 (ALT 250 FOR IP): Performed by: REGISTERED NURSE

## 2022-12-04 PROCEDURE — 87635 SARS-COV-2 COVID-19 AMP PRB: CPT

## 2022-12-04 PROCEDURE — 87804 INFLUENZA ASSAY W/OPTIC: CPT

## 2022-12-04 PROCEDURE — 80048 BASIC METABOLIC PNL TOTAL CA: CPT

## 2022-12-04 PROCEDURE — 36415 COLL VENOUS BLD VENIPUNCTURE: CPT

## 2022-12-04 PROCEDURE — 99285 EMERGENCY DEPT VISIT HI MDM: CPT

## 2022-12-04 PROCEDURE — 84484 ASSAY OF TROPONIN QUANT: CPT

## 2022-12-04 PROCEDURE — 85025 COMPLETE CBC W/AUTO DIFF WBC: CPT

## 2022-12-04 PROCEDURE — 2580000003 HC RX 258: Performed by: REGISTERED NURSE

## 2022-12-04 PROCEDURE — 6360000004 HC RX CONTRAST MEDICATION: Performed by: REGISTERED NURSE

## 2022-12-04 PROCEDURE — 93005 ELECTROCARDIOGRAM TRACING: CPT | Performed by: REGISTERED NURSE

## 2022-12-04 RX ORDER — SODIUM CHLORIDE 0.9 % (FLUSH) 0.9 %
10 SYRINGE (ML) INJECTION PRN
Status: DISCONTINUED | OUTPATIENT
Start: 2022-12-04 | End: 2022-12-04 | Stop reason: HOSPADM

## 2022-12-04 RX ORDER — 0.9 % SODIUM CHLORIDE 0.9 %
70 INTRAVENOUS SOLUTION INTRAVENOUS ONCE
Status: COMPLETED | OUTPATIENT
Start: 2022-12-04 | End: 2022-12-04

## 2022-12-04 RX ORDER — LEVOFLOXACIN 750 MG/1
750 TABLET ORAL DAILY
Qty: 4 TABLET | Refills: 0 | Status: SHIPPED | OUTPATIENT
Start: 2022-12-04 | End: 2022-12-08

## 2022-12-04 RX ORDER — LEVOFLOXACIN 500 MG/1
750 TABLET, FILM COATED ORAL ONCE
Status: COMPLETED | OUTPATIENT
Start: 2022-12-04 | End: 2022-12-04

## 2022-12-04 RX ORDER — 0.9 % SODIUM CHLORIDE 0.9 %
1000 INTRAVENOUS SOLUTION INTRAVENOUS ONCE
Status: COMPLETED | OUTPATIENT
Start: 2022-12-04 | End: 2022-12-04

## 2022-12-04 RX ADMIN — SODIUM CHLORIDE, PRESERVATIVE FREE 10 ML: 5 INJECTION INTRAVENOUS at 18:02

## 2022-12-04 RX ADMIN — SODIUM CHLORIDE 70 ML: 9 INJECTION, SOLUTION INTRAVENOUS at 18:02

## 2022-12-04 RX ADMIN — IOPAMIDOL 75 ML: 755 INJECTION, SOLUTION INTRAVENOUS at 18:01

## 2022-12-04 RX ADMIN — SODIUM CHLORIDE 1000 ML: 9 INJECTION, SOLUTION INTRAVENOUS at 17:13

## 2022-12-04 RX ADMIN — LEVOFLOXACIN 750 MG: 500 TABLET, FILM COATED ORAL at 19:23

## 2022-12-04 ASSESSMENT — ENCOUNTER SYMPTOMS
ABDOMINAL PAIN: 0
NAUSEA: 0
SHORTNESS OF BREATH: 1
VOMITING: 0
BACK PAIN: 0
DIARRHEA: 0
COUGH: 1

## 2022-12-04 NOTE — ED PROVIDER NOTES
[facility] ED  [address] [facility]  Phone: [phone]      Attending Physician Attestation    I performed a history and physical examination of the patient and discussed management with the mid level provider. I reviewed the mid level provider's note and agree with the documented findings and plan of care. Any areas of disagreement are noted on the chart. I was personally present for the key portions of any procedures. I have documented in the chart those procedures where I was not present during the key portions. I have reviewed the emergency nurses triage note. I agree with the chief complaint, past medical history, past surgical history, allergies, medications, social and family history as documented unless otherwise noted below. Documentation of the HPI, Physical Exam and Medical Decision Making performed by mid level providers is based on my personal performance of the HPI, PE and MDM. For Physician Assistant/ Nurse Practitioner cases/documentation I have personally evaluated this patient and have completed at least one if not all key elements of the E/M (history, physical exam, and MDM). Additional findings are as noted. CHIEF COMPLAINT       Chief Complaint   Patient presents with    Cough     X 2 weeks    Fatigue     Difficultly sleeping. Altered Mental Status     Earlier in the day he was so tired he turned off the car and put it in park at the train station in [place] and a  had to come over         [facility]    Dago Slaughter is a 79 y.o. male who presents concerns of 2-week history of cough congestion and myalgias as well as fatigue      PAST MEDICAL HISTORY    has a past medical history of CAD (coronary artery disease), Diabetes mellitus ([address] History of quadruple bypass, Hyperlipidemia, and Hypertension. SURGICAL HISTORY      has a past surgical history that includes Cardiac surgery (2018);  Upper gastrointestinal endoscopy; Colonoscopy; and Lung cancer surgery (2017). CURRENT MEDICATIONS       Previous Medications    ALBUTEROL SULFATE HFA (VENTOLIN HFA) 108 (90 BASE) MCG/ACT INHALER    Inhale 2 puffs into the lungs every 4-6 hours as needed for Wheezing    ATORVASTATIN (LIPITOR) 40 MG TABLET    Take 40 mg by mouth daily    LOSARTAN (COZAAR) 50 MG TABLET    Take 50 mg by mouth daily    METFORMIN, OSM, (FORTAMET) 1000 MG EXTENDED RELEASE TABLET    Take 2,000 mg by mouth daily (with breakfast) Takes 2 tabs (=2000mg) once daily    METOPROLOL TARTRATE (LOPRESSOR) 25 MG TABLET    Take 12.5 mg by mouth daily Takes 1/2 tab (=12.5mg) once daily    PANTOPRAZOLE (PROTONIX) 40 MG TABLET    Take 40 mg by mouth daily    PROPYLENE GLYCOL (SYSTANE COMPLETE OP)    Place 1 drop into both eyes 4 times daily    SEMAGLUTIDE (RYBELSUS) 3 MG TABS    Take 3 mg by mouth daily    UMECLIDINIUM BROMIDE (INCRUSE ELLIPTA) 62.5 MCG/INH AEPB    Inhale 1 puff into the lungs daily    VITAMIN D (CHOLECALCIFEROL) 25 MCG (1000 UT) TABS TABLET    Take 1,000 Units by mouth daily       ALLERGIES     is allergic to cat hair extract and shrimp (diagnostic). FAMILY HISTORY     He indicated that his mother is . He indicated that his father is . family history includes Alzheimer's Disease in his mother; Heart Attack in his father. SOCIAL HISTORY      reports that he quit smoking about 7 years ago. He has a 72.00 pack-year smoking history. He has never used smokeless tobacco. He reports that he does not drink alcohol and does not use drugs. PHYSICAL EXAM     INITIAL VITALS:  temperature is 98.2 °F (36.8 °C). His blood pressure is 135/90 (abnormal) and his pulse is 103 (abnormal). His respiration is 20 and oxygen saturation is 94%. Physical exam was performed by myself independently of the PA. I do not find any acute signs of distress or abnormalities of acute concern today.       DIAGNOSTIC RESULTS     EKG ventricular rate of 106 sinus tachycardia normal axis normal intervals no acute ST or T wave changes of concern. Q-wave isolation in lead III wavy baseline artifact. No old EKG available for review at this point. RADIOLOGY:   Non-plain film images such as CT, Ultrasound and MRI are read by the radiologist. Plain radiographic images are visualized and the radiologist interpretations are reviewed as follows:     CT CHEST PULMONARY EMBOLISM W CONTRAST   Final Result   Right lower lobe reticulonodular infiltrate most likely representing pneumonia      Old granulomatous lung disease      This study is nondiagnostic for pulmonary embolism. There is not significant   contrast within the pulmonary artery system to adequately assess for   pulmonary embolus. Recommend either repeat exam with improved bolus timing, and decreased   patient respiratory motion or VQ scan. Coronary artery/atherosclerotic disease      RECOMMENDATIONS:   Based on MIPS measure 405, incidental abd lesions in this patient population   do not warrant F/U W/O a documented medical reason. Normal sinus rhythm sinus tachycardia ventricular rate of 106 wavy baseline artifact no acute ST or T wave changes of concern. No EKG available. LABS:  Results for orders placed or performed during the hospital encounter of 12/04/22   Rapid Influenza A/B Antigens    Specimen: Nasopharyngeal   Result Value Ref Range    Flu A Antigen NEGATIVE NEGATIVE    Flu B Antigen NEGATIVE NEGATIVE   COVID-19, Rapid    Specimen: Nasopharyngeal Swab   Result Value Ref Range    Specimen Description . NASOPHARYNGEAL SWAB     SARS-CoV-2, Rapid Not Detected Not Detected   CBC with Auto Differential   Result Value Ref Range    WBC 9.8 3.5 - 11.0 k/uL    RBC 5.03 4.5 - 5.9 m/uL    Hemoglobin 15.4 13.5 - 17.5 g/dL    Hematocrit 45.4 41 - 53 %    MCV 90.1 80 - 100 fL    MCH 30.6 26 - 34 pg    MCHC 34.0 31 - 37 g/dL    RDW 14.5 12.5 - 15.4 %    Platelets 719 623 - 372 k/uL    MPV 6.5 6.0 - 12.0 fL Seg Neutrophils 75 (H) 36 - 66 %    Lymphocytes 14 (L) 24 - 44 %    Monocytes 9 2 - 11 %    Eosinophils % 1 1 - 4 %    Basophils 1 0 - 2 %    Segs Absolute 7.30 1.8 - 7.7 k/uL    Absolute Lymph # 1.40 1.0 - 4.8 k/uL    Absolute Mono # 0.90 0.1 - 1.2 k/uL    Absolute Eos # 0.10 0.0 - 0.4 k/uL    Basophils Absolute 0.00 0.0 - 0.2 k/uL   Basic Metabolic Panel   Result Value Ref Range    Glucose 91 70 - 99 mg/dL    BUN 14 8 - 23 mg/dL    Creatinine 0.80 0.70 - 1.20 mg/dL    Est, Glom Filt Rate >60 >60 mL/min/1.73m2    Calcium 9.8 8.6 - 10.4 mg/dL    Sodium 138 135 - 144 mmol/L    Potassium 4.0 3.7 - 5.3 mmol/L    Chloride 98 98 - 107 mmol/L    CO2 31 20 - 31 mmol/L    Anion Gap 9 9 - 17 mmol/L   Troponin   Result Value Ref Range    Troponin, High Sensitivity 14 0 - 22 ng/L   EKG 12 Lead   Result Value Ref Range    Ventricular Rate 106 BPM    Atrial Rate 106 BPM    P-R Interval 140 ms    QRS Duration 90 ms    Q-T Interval 342 ms    QTc Calculation (Bazett) 454 ms    P Axis 53 degrees    R Axis 57 degrees    T Axis 56 degrees           EMERGENCY DEPARTMENT COURSE:   Vitals:    Vitals:    12/04/22 1721 12/04/22 1826 12/04/22 1843 12/04/22 1903   BP:  (!) 135/90     Pulse: (!) 102 (!) 106 (!) 104 (!) 103   Resp:       Temp:       SpO2: 94% 91% 92% 94%     -------------------------  BP: (!) 135/90, Temp: 98.2 °F (36.8 °C), Heart Rate: (!) 103, Resp: 20      PERTINENT ATTENDING PHYSICIAN COMMENTS:    Patient presented emerged part was personally seen and evaluated by myself in conjunction with time the physician assistant. Patient presents here with 2-week history of very vague complaints. He is very difficult to discern what his chief complaint really is. Patient admits that he feels quite fatigued and believes it is in the environment that he lives in with his family that have excessive amount of dogs that he may be allergic to. Because of his comorbidities a full cardiac work-up including CT chest will be obtained.   At this point patient rest comfortably remains hemodynamically and neurologically intact. Work-up revealed that the patient had a infiltrate on his right lower lobe lung. He is not febrile  nor hypoxic. No signs of respiratory distress and his symptoms of been present for 2 weeks. At this point in time we are concerned with the fact the patient has had a lobectomy and has history of lung CA but his labs do not reveal any acute process nor do his vitals indicate any concern that he will need to be transferred or admitted. We will place the patient on antibiotics with strict instructions to follow-up in 24 hours with his PCP. He was also instructed return immediately symptoms worsen or persist.  Patient feels very comfortable this plan would rather go home then be transferred and admitted.           (Please note that portions of this note were completed with a voice recognition program.  Efforts were made to edit the dictations but occasionally words are mis-transcribed.)    Shalini De Santiago DO  Board Certified Emergency Medicine Physician       Shalini De Santiago DO  12/08/22 1947

## 2022-12-04 NOTE — ED PROVIDER NOTES
[facility]  [facility]  Phone: [phone]        Pt Name: Mohinder Aguero  MRN: [id number]  [place] 1955  Date of evaluation: 22    CHIEFCOMPLAINT       Chief Complaint   Patient presents with    Cough     X 2 weeks    Fatigue     Difficultly sleeping. Altered Mental Status     Earlier in the day he was so tired he turned off the car and put it in park at the train station in [place] and a  had to come over       [address] [facility] (Location/Symptom, Timing/Onset, Context/Setting, Quality, Duration, Modifying Factors, Severity)      Mohinder Aguero is a 79 y.o. male with no pertinent PMH who presents to the ED via private auto with cough, shortness of breath, fatigue ongoing for few weeks. Patient states he has family in town that are currently sick and is unsure if he contracted something from them. He denies any fevers or chills, chest pain, abdominal pain, nausea vomiting or diarrhea. Patient also states over the last few days his blood pressure and blood sugars been elevated with his blood sugar being 160 and his blood pressure been in the 120s. Arrival he is resting the cot comfortably with even unlabored breaths is nontoxic-appearing with no acute distress noted. PAST MEDICAL / SURGICAL / SOCIAL / FAMILY HISTORY     PMH:  has a past medical history of CAD (coronary artery disease), Diabetes mellitus ([address] History of quadruple bypass, Hyperlipidemia, and Hypertension. Surgical History:  has a past surgical history that includes Cardiac surgery (2018); Upper gastrointestinal endoscopy; Colonoscopy; and Lung cancer surgery (2017). Social History:  reports that he quit smoking about 7 years ago. He has a 72.00 pack-year smoking history. He has never used smokeless tobacco. He reports that he does not drink alcohol and does not use drugs. Family History: He indicated that his mother is .  He indicated that his father is . family history includes Alzheimer's Disease in his mother; Heart Attack in his father. Psychiatric History: None    Allergies: Cat hair extract and Shrimp (diagnostic)    Home Medications:   Prior to Admission medications    Medication Sig Start Date End Date Taking? Authorizing Provider   levoFLOXacin (LEVAQUIN) 750 MG tablet Take 1 tablet by mouth daily for 4 days 22 Yes Starr Franco, APRN - CNP   albuterol sulfate HFA (VENTOLIN HFA) 108 (90 Base) MCG/ACT inhaler Inhale 2 puffs into the lungs every 4-6 hours as needed for Wheezing    Historical Provider, MD   atorvastatin (LIPITOR) 40 MG tablet Take 40 mg by mouth daily    Historical Provider, MD   vitamin D (CHOLECALCIFEROL) 25 MCG (1000 UT) TABS tablet Take 1,000 Units by mouth daily    Historical Provider, MD   losartan (COZAAR) 50 MG tablet Take 50 mg by mouth daily    Historical Provider, MD   metoprolol tartrate (LOPRESSOR) 25 MG tablet Take 12.5 mg by mouth daily Takes 1/2 tab (=12.5mg) once daily    Historical Provider, MD   pantoprazole (PROTONIX) 40 MG tablet Take 40 mg by mouth daily    Historical Provider, MD   Semaglutide (RYBELSUS) 3 MG TABS Take 3 mg by mouth daily    Historical Provider, MD   Umeclidinium Bromide (INCRUSE ELLIPTA) 62.5 MCG/INH AEPB Inhale 1 puff into the lungs daily    Historical Provider, MD   metFORMIN, OSM, (FORTAMET) 1000 MG extended release tablet Take 2,000 mg by mouth daily (with breakfast) Takes 2 tabs (=2000mg) once daily    Historical Provider, MD   Propylene Glycol (SYSTANE COMPLETE OP) Place 1 drop into both eyes 4 times daily    Historical Provider, MD       REVIEW OF SYSTEMS  (2-9 systems for level 4, 10 ormore for level 5)      Review of Systems   Constitutional:  Positive for fatigue. Negative for chills and fever. Respiratory:  Positive for cough and shortness of breath. Cardiovascular:  Negative for chest pain and palpitations.    Gastrointestinal:  Negative for abdominal pain, diarrhea, nausea and vomiting. Musculoskeletal:  Negative for back pain, neck pain and neck stiffness. Neurological:  Negative for dizziness, numbness and headaches. All other systems negative except as marked. PHYSICAL EXAM  (up to 7 for level 4, 8 or more for level 5)      INITIAL VITALS:  weight is 113.4 kg (250 lb). His temperature is 98.2 °F (36.8 °C). His blood pressure is 135/90 (abnormal) and his pulse is 103 (abnormal). His respiration is 20 and oxygen saturation is 94%. Vital signs reviewed. Physical Exam  Constitutional:       General: He is not in acute distress. Appearance: Normal appearance. He is not ill-appearing or toxic-appearing. HENT:      Head: Normocephalic and atraumatic. Nose: Nose normal. No congestion or rhinorrhea. Cardiovascular:      Rate and Rhythm: Regular rhythm. Tachycardia present. Pulses: Normal pulses. Heart sounds: Normal heart sounds. Pulmonary:      Effort: Pulmonary effort is normal.      Breath sounds: Normal breath sounds. Abdominal:      General: Abdomen is flat. Bowel sounds are normal. There is no distension. Palpations: Abdomen is soft. There is no mass. Tenderness: There is no abdominal tenderness. There is no guarding or rebound. Hernia: No hernia is present. Musculoskeletal:      Cervical back: Neck supple. No rigidity. Right lower leg: No edema. Left lower leg: No edema. Skin:     General: Skin is warm and dry. Capillary Refill: Capillary refill takes less than 2 seconds. Neurological:      General: No focal deficit present. Mental Status: He is alert and oriented to person, place, and time. Cranial Nerves: No cranial nerve deficit. Sensory: No sensory deficit. Motor: No weakness. Psychiatric:         Mood and Affect: Mood normal.         Behavior: Behavior normal.         DIFFERENTIAL DIAGNOSIS / MDM     After physical exam, plan obtain rapid COVID and flu.   We Rapid Influenza A/B Antigens    COVID-19, Rapid    CT CHEST PULMONARY EMBOLISM W CONTRAST    CBC with Auto Differential    Basic Metabolic Panel    Troponin    Urinalysis with Reflex to Culture    EKG 12 Lead       MEDICATIONS ORDERED:  Orders Placed This Encounter   Medications    0.9 % sodium chloride bolus    0.9 % sodium chloride bolus    sodium chloride flush 0.9 % injection 10 mL    iopamidol (ISOVUE-370) 76 % injection 75 mL    levoFLOXacin (LEVAQUIN) tablet 750 mg     Order Specific Question:   Antimicrobial Indications     Answer:   Pneumonia (CAP)    levoFLOXacin (LEVAQUIN) 750 MG tablet     Sig: Take 1 tablet by mouth daily for 4 days     Dispense:  4 tablet     Refill:  0       Controlled Substances Monitoring:     DIAGNOSTIC RESULTS     EKG: All EKG's are interpreted by the Emergency Department Physician who either signs or Co-signs this chart in the absenceof a cardiologist.        RADIOLOGY: All images are read by the radiologist and their interpretations are reviewed. CT CHEST PULMONARY EMBOLISM W CONTRAST   Final Result   Right lower lobe reticulonodular infiltrate most likely representing pneumonia      Old granulomatous lung disease      This study is nondiagnostic for pulmonary embolism. There is not significant   contrast within the pulmonary artery system to adequately assess for   pulmonary embolus. Recommend either repeat exam with improved bolus timing, and decreased   patient respiratory motion or VQ scan. Coronary artery/atherosclerotic disease      RECOMMENDATIONS:   Based on MIPS measure 405, incidental abd lesions in this patient population   do not warrant F/U W/O a documented medical reason. No results found.     LABS:  Results for orders placed or performed during the hospital encounter of 12/04/22   Rapid Influenza A/B Antigens    Specimen: Nasopharyngeal   Result Value Ref Range    Flu A Antigen NEGATIVE NEGATIVE    Flu B Antigen NEGATIVE NEGATIVE COVID-19, Rapid    Specimen: Nasopharyngeal Swab   Result Value Ref Range    Specimen Description . NASOPHARYNGEAL SWAB     SARS-CoV-2, Rapid Not Detected Not Detected   CBC with Auto Differential   Result Value Ref Range    WBC 9.8 3.5 - 11.0 k/uL    RBC 5.03 4.5 - 5.9 m/uL    Hemoglobin 15.4 13.5 - 17.5 g/dL    Hematocrit 45.4 41 - 53 %    MCV 90.1 80 - 100 fL    MCH 30.6 26 - 34 pg    MCHC 34.0 31 - 37 g/dL    RDW 14.5 12.5 - 15.4 %    Platelets 689 998 - 561 k/uL    MPV 6.5 6.0 - 12.0 fL    Seg Neutrophils 75 (H) 36 - 66 %    Lymphocytes 14 (L) 24 - 44 %    Monocytes 9 2 - 11 %    Eosinophils % 1 1 - 4 %    Basophils 1 0 - 2 %    Segs Absolute 7.30 1.8 - 7.7 k/uL    Absolute Lymph # 1.40 1.0 - 4.8 k/uL    Absolute Mono # 0.90 0.1 - 1.2 k/uL    Absolute Eos # 0.10 0.0 - 0.4 k/uL    Basophils Absolute 0.00 0.0 - 0.2 k/uL   Basic Metabolic Panel   Result Value Ref Range    Glucose 91 70 - 99 mg/dL    BUN 14 8 - 23 mg/dL    Creatinine 0.80 0.70 - 1.20 mg/dL    Est, Glom Filt Rate >60 >60 mL/min/1.73m2    Calcium 9.8 8.6 - 10.4 mg/dL    Sodium 138 135 - 144 mmol/L    Potassium 4.0 3.7 - 5.3 mmol/L    Chloride 98 98 - 107 mmol/L    CO2 31 20 - 31 mmol/L    Anion Gap 9 9 - 17 mmol/L   Troponin   Result Value Ref Range    Troponin, High Sensitivity 14 0 - 22 ng/L   EKG 12 Lead   Result Value Ref Range    Ventricular Rate 106 BPM    Atrial Rate 106 BPM    P-R Interval 140 ms    QRS Duration 90 ms    Q-T Interval 342 ms    QTc Calculation (Bazett) 454 ms    P Axis 53 degrees    R Axis 57 degrees    T Axis 56 degrees       EMERGENCY DEPARTMENT COURSE           Vitals:    Vitals:    12/04/22 1826 12/04/22 1843 12/04/22 1903 12/04/22 1924   BP: (!) 135/90      Pulse: (!) 106 (!) 104 (!) 103    Resp:       Temp:       SpO2: 91% 92% 94%    Weight:    113.4 kg (250 lb)     -------------------------  BP: (!) 135/90, Temp: 98.2 °F (36.8 °C), Heart Rate: (!) 103, Resp: 20      RE-EVALUATION:  See ED Course notes above.        CONSULTS:  None    PROCEDURES:  None    FINAL IMPRESSION      1. Pneumonia of right lower lobe due to infectious organism          DISPOSITION / PLAN     CONDITION ON DISPOSITION:   Stable for discharge.      PATIENT REFERRED TO:  Edy Ibarra MD  [address] [place] [address] [id number] [phone]    Call in 1 day      [facility]  [address]  [phone]    If symptoms worsen    DISCHARGE MEDICATIONS:  New Prescriptions    LEVOFLOXACIN (LEVAQUIN) 750 MG TABLET    Take 1 tablet by mouth daily for 4 days       YOLY Aguilar Sa, CNP   Emergency Medicine Nurse Practitioner    (Please note that portions of this note were completed with a voice recognition program.  Efforts were made to edit the dictations but occasionally words aremis-transcribed.)       YOLY Aguilar Sa, CNP  12/04/22 1953

## 2022-12-05 LAB
EKG ATRIAL RATE: 106 BPM
EKG P AXIS: 53 DEGREES
EKG P-R INTERVAL: 140 MS
EKG Q-T INTERVAL: 342 MS
EKG QRS DURATION: 90 MS
EKG QTC CALCULATION (BAZETT): 454 MS
EKG R AXIS: 57 DEGREES
EKG T AXIS: 56 DEGREES
EKG VENTRICULAR RATE: 106 BPM

## 2022-12-05 NOTE — DISCHARGE INSTRUCTIONS
Please understand that at this time there is no evidence for a more serious underlying process, but that early in the process of an illness or injury, an emergency department workup can be falsely reassuring. You should contact your family doctor within the next 48 hours for a follow up appointment    Jessica Arrington!!!    From [facility] ([facility]) and [facility]    On behalf of the Emergency Department staff at [facility]), I would like to thank you for giving us the opportunity to address your health care needs and concerns. We hope that during your visit, our service was delivered in a professional and caring manner. Please keep [facility] ([facility]) in mind as we walk with you down the path to your own personal wellness. Please expect an automated text message or email from us so we can ask a few questions about your health and progress. Based on your answers, a clinician may call you back to offer help and instructions. Please understand that early in the process of an illness or injury, an emergency department workup can be falsely reassuring. If you notice any worsening, changing or persistent symptoms please call your family doctor or return to the ER immediately. Tell us how we did during your visit at [email or web address]   and let us know about your experience

## 2022-12-08 ENCOUNTER — HOSPITAL ENCOUNTER (EMERGENCY)
Facility: CLINIC | Age: 67
Discharge: HOME | End: 2022-12-08
Attending: EMERGENCY MEDICINE
Payer: MEDICARE

## 2022-12-08 ENCOUNTER — APPOINTMENT (OUTPATIENT)
Dept: GENERAL RADIOLOGY | Facility: CLINIC | Age: 67
End: 2022-12-08
Payer: MEDICARE

## 2022-12-08 VITALS
SYSTOLIC BLOOD PRESSURE: 135 MMHG | HEART RATE: 107 BPM | BODY MASS INDEX: 35.79 KG/M2 | WEIGHT: 250 LBS | TEMPERATURE: 97.9 F | HEIGHT: 70 IN | DIASTOLIC BLOOD PRESSURE: 88 MMHG | RESPIRATION RATE: 16 BRPM | OXYGEN SATURATION: 95 %

## 2022-12-08 DIAGNOSIS — R05.9 COUGH, UNSPECIFIED TYPE: ICD-10-CM

## 2022-12-08 DIAGNOSIS — T73.2XXD FATIGUE DUE TO EXPOSURE, SUBSEQUENT ENCOUNTER: Primary | ICD-10-CM

## 2022-12-08 LAB
ABSOLUTE EOS #: 0.1 K/UL (ref 0–0.4)
ABSOLUTE LYMPH #: 1.3 K/UL (ref 1–4.8)
ABSOLUTE MONO #: 0.9 K/UL (ref 0.1–1.2)
ANION GAP SERPL CALCULATED.3IONS-SCNC: 9 MMOL/L (ref 9–17)
BASOPHILS # BLD: 1 % (ref 0–2)
BASOPHILS ABSOLUTE: 0 K/UL (ref 0–0.2)
BUN BLDV-MCNC: 13 MG/DL (ref 8–23)
CALCIUM SERPL-MCNC: 9.3 MG/DL (ref 8.6–10.4)
CHLORIDE BLD-SCNC: 101 MMOL/L (ref 98–107)
CO2: 30 MMOL/L (ref 20–31)
CREAT SERPL-MCNC: 0.7 MG/DL (ref 0.7–1.2)
EOSINOPHILS RELATIVE PERCENT: 1 % (ref 1–4)
GFR SERPL CREATININE-BSD FRML MDRD: >60 ML/MIN/1.73M2
GLUCOSE BLD-MCNC: 96 MG/DL (ref 70–99)
HCT VFR BLD CALC: 45.8 % (ref 41–53)
HEMOGLOBIN: 15.5 G/DL (ref 13.5–17.5)
LYMPHOCYTES # BLD: 14 % (ref 24–44)
MCH RBC QN AUTO: 30.6 PG (ref 26–34)
MCHC RBC AUTO-ENTMCNC: 33.8 G/DL (ref 31–37)
MCV RBC AUTO: 90.7 FL (ref 80–100)
MONOCYTES # BLD: 10 % (ref 2–11)
PDW BLD-RTO: 14.6 % (ref 12.5–15.4)
PLATELET # BLD: 283 K/UL (ref 140–450)
PMV BLD AUTO: 6.5 FL (ref 6–12)
POTASSIUM SERPL-SCNC: 4 MMOL/L (ref 3.7–5.3)
RBC # BLD: 5.05 M/UL (ref 4.5–5.9)
SEG NEUTROPHILS: 74 % (ref 36–66)
SEGMENTED NEUTROPHILS ABSOLUTE COUNT: 6.9 K/UL (ref 1.8–7.7)
SODIUM BLD-SCNC: 140 MMOL/L (ref 135–144)
TROPONIN, HIGH SENSITIVITY: 17 NG/L (ref 0–22)
WBC # BLD: 9.2 K/UL (ref 3.5–11)

## 2022-12-08 PROCEDURE — 71046 X-RAY EXAM CHEST 2 VIEWS: CPT

## 2022-12-08 PROCEDURE — 36415 COLL VENOUS BLD VENIPUNCTURE: CPT

## 2022-12-08 PROCEDURE — 99285 EMERGENCY DEPT VISIT HI MDM: CPT

## 2022-12-08 PROCEDURE — 93005 ELECTROCARDIOGRAM TRACING: CPT | Performed by: EMERGENCY MEDICINE

## 2022-12-08 PROCEDURE — 85025 COMPLETE CBC W/AUTO DIFF WBC: CPT

## 2022-12-08 PROCEDURE — 84484 ASSAY OF TROPONIN QUANT: CPT

## 2022-12-08 PROCEDURE — 80048 BASIC METABOLIC PNL TOTAL CA: CPT

## 2022-12-08 ASSESSMENT — ENCOUNTER SYMPTOMS
SHORTNESS OF BREATH: 0
ABDOMINAL PAIN: 0
NAUSEA: 0
WHEEZING: 0
DIARRHEA: 0
VOMITING: 0
BACK PAIN: 0
SORE THROAT: 0
COUGH: 0

## 2022-12-08 ASSESSMENT — PAIN - FUNCTIONAL ASSESSMENT: PAIN_FUNCTIONAL_ASSESSMENT: NONE - DENIES PAIN

## 2022-12-09 NOTE — ED PROVIDER NOTES
[facility] ED  [address] [facility]  Phone: [phone]        ADDENDUM:      Care of this patient was assumed from lint the patient was seen for Cough  . The patient's initial evaluation and plan have been discussed with the prior provider who initially evaluated the patient. Nursing Notes, Past Medical Hx, Past Surgical Hx, Allergies, were all reviewed. PAST MEDICAL HISTORY    has a past medical history of CAD (coronary artery disease), Diabetes mellitus ([address] History of quadruple bypass, Hyperlipidemia, and Hypertension. SURGICAL HISTORY      has a past surgical history that includes Cardiac surgery (2018); Upper gastrointestinal endoscopy; Colonoscopy; and Lung cancer surgery (2017). CURRENT MEDICATIONS       Previous Medications    ALBUTEROL SULFATE HFA (VENTOLIN HFA) 108 (90 BASE) MCG/ACT INHALER    Inhale 2 puffs into the lungs every 4-6 hours as needed for Wheezing    ATORVASTATIN (LIPITOR) 40 MG TABLET    Take 40 mg by mouth daily    LEVOFLOXACIN (LEVAQUIN) 750 MG TABLET    Take 1 tablet by mouth daily for 4 days    LOSARTAN (COZAAR) 50 MG TABLET    Take 50 mg by mouth daily    METFORMIN, OSM, (FORTAMET) 1000 MG EXTENDED RELEASE TABLET    Take 2,000 mg by mouth daily (with breakfast) Takes 2 tabs (=2000mg) once daily    METOPROLOL TARTRATE (LOPRESSOR) 25 MG TABLET    Take 12.5 mg by mouth daily Takes 1/2 tab (=12.5mg) once daily    PANTOPRAZOLE (PROTONIX) 40 MG TABLET    Take 40 mg by mouth daily    PROPYLENE GLYCOL (SYSTANE COMPLETE OP)    Place 1 drop into both eyes 4 times daily    SEMAGLUTIDE (RYBELSUS) 3 MG TABS    Take 3 mg by mouth daily    UMECLIDINIUM BROMIDE (INCRUSE ELLIPTA) 62.5 MCG/INH AEPB    Inhale 1 puff into the lungs daily    VITAMIN D (CHOLECALCIFEROL) 25 MCG (1000 UT) TABS TABLET    Take 1,000 Units by mouth daily       ALLERGIES     is allergic to cat hair extract and shrimp (diagnostic).       Diagnostic Results     EKG: All EKG's are interpreted by the Emergency Department Physician who either signs or Co-signs this chart in the absenceof a cardiologist.    EKG interpreted by me reveals a mild sinus tach at 106 with no acute ST elevations depressions normal GA interval normal QS complex normal axis     RADIOLOGY:        Interpretation per the Radiologist below, if available at the time of this note:        LABS:   Results for orders placed or performed during the hospital encounter of 12/08/22   CBC with Auto Differential   Result Value Ref Range    WBC 9.2 3.5 - 11.0 k/uL    RBC 5.05 4.5 - 5.9 m/uL    Hemoglobin 15.5 13.5 - 17.5 g/dL    Hematocrit 45.8 41 - 53 %    MCV 90.7 80 - 100 fL    MCH 30.6 26 - 34 pg    MCHC 33.8 31 - 37 g/dL    RDW 14.6 12.5 - 15.4 %    Platelets 285 086 - 257 k/uL    MPV 6.5 6.0 - 12.0 fL    Seg Neutrophils 74 (H) 36 - 66 %    Lymphocytes 14 (L) 24 - 44 %    Monocytes 10 2 - 11 %    Eosinophils % 1 1 - 4 %    Basophils 1 0 - 2 %    Segs Absolute 6.90 1.8 - 7.7 k/uL    Absolute Lymph # 1.30 1.0 - 4.8 k/uL    Absolute Mono # 0.90 0.1 - 1.2 k/uL    Absolute Eos # 0.10 0.0 - 0.4 k/uL    Basophils Absolute 0.00 0.0 - 0.2 k/uL   Basic Metabolic Panel   Result Value Ref Range    Glucose 96 70 - 99 mg/dL    BUN 13 8 - 23 mg/dL    Creatinine 0.70 0.70 - 1.20 mg/dL    Est, Glom Filt Rate >60 >60 mL/min/1.73m2    Calcium 9.3 8.6 - 10.4 mg/dL    Sodium 140 135 - 144 mmol/L    Potassium 4.0 3.7 - 5.3 mmol/L    Chloride 101 98 - 107 mmol/L    CO2 30 20 - 31 mmol/L    Anion Gap 9 9 - 17 mmol/L   Troponin One time   Result Value Ref Range    Troponin, High Sensitivity 17 0 - 22 ng/L       RADIOLOGY:  XR CHEST (2 VW)   Final Result      Atelectatic changes of right lung base. Right hilar fullness which may be related to right hilar lymphadenopathy or   consolidative change.              RECENT VITALS:  BP: 135/88, Temp: 97.9 °F (36.6 °C), Heart Rate: (!) 107, Resp: 16     ED Course     The patient was given the following medications:  No orders of the defined types were placed in this encounter. Medical Decision Making               EMERGENCY DEPARTMENT COURSE:   Vitals:    Vitals:    12/08/22 1842 12/08/22 1847   BP: 135/88    Pulse: (!) 107    Resp: 16    Temp:  97.9 °F (36.6 °C)   TempSrc: Oral    SpO2: 95%    Weight: 113.4 kg (250 lb)    Height: 5' 10\" (1.778 m)      -------------------------  BP: 135/88, Temp: 97.9 °F (36.6 °C), Heart Rate: (!) 107, Resp: 16      RE-EVALUATION:  Patient states he feels generally okay reevaluation shows lung sounds to be clear heart rate is decreased but borderline tachycardic chest x-ray per radiologist reveals fullness in the right hilar region atelectasis in the right base is taken his last Zithromax today at this time I will have him follow-up with his primary for reevaluation in 2 days return if worse    CONSULTS:      PROCEDURES:  None    FINAL IMPRESSION      1. Fatigue due to exposure, subsequent encounter    2.  Cough, unspecified type          DISPOSITION/PLAN   DISPOSITION Decision To Discharge 12/08/2022 09:28:55 PM      CONDITION ON DISPOSITION:   Stable    PATIENT REFERRED TO:  MD [address] [id number] [phone]    In 2 days        DISCHARGE MEDICATIONS:  New Prescriptions    No medications on file       (Please note that portions of this note were completed with a voicerecognition program.  Efforts were made to edit the dictations but occasionally words are mis-transcribed.)    Bradley Mcdonnell MD  Attending Emergency Medicine Physician                      Bradley Mcdonnell MD  12/08/22 8393

## 2022-12-09 NOTE — ED PROVIDER NOTES
[facility] ED  [address] [facility]  Phone: [phone]    eMERGENCY dEPARTMENT eNCOUnter        Pt Name: Shawnee Guevara  MRN: [id number]  [place] 1955  Date of evaluation: 12/8/22    CHIEF COMPLAINT     Chief Complaint   Patient presents with    Cough       HISTORY OF PRESENT ILLNESS    Shawnee Guevara is a 79 y.o. male who presents to the emergency department per the recommendation of his nurse practitioner. The nurse practitioner from his doctor office called this evening to follow-up on the patient. He told her that he was not feeling the best still kind of weak and fatigued and he recommended him to go back to the emergency department to be seen and evaluated for the second time. He was here on Sunday and was diagnosed with pneumonia. This was found on CAT scan. Patient was sent home on an antibiotic which she has been compliant with. Patient denies any fever chills cough or congestion. Is a little bit of shortness of breath but he stated that it is with all the sinus congestion and usually clears by mid to late morning. He denies any abdominal pain nausea vomiting diarrhea. No recent fever injury or trauma. No leg pain or swelling. No recent injury or trauma. He stated that his blood sugars were low this morning as well as a good blood pressure. REVIEW OF SYSTEMS     Review of Systems   Constitutional:  Negative for chills and fever. HENT:  Negative for congestion, ear pain and sore throat. Respiratory:  Negative for cough, shortness of breath and wheezing. Cardiovascular:  Negative for chest pain, palpitations and leg swelling. Gastrointestinal:  Negative for abdominal pain, diarrhea, nausea and vomiting. Genitourinary:  Negative for dysuria, flank pain, frequency and hematuria. Musculoskeletal:  Negative for back pain. Skin:  Negative for rash. Neurological:  Negative for dizziness, light-headedness, numbness and headaches.      PAST MEDICAL HISTORY has a past medical history of CAD (coronary artery disease), Diabetes mellitus ([address] History of quadruple bypass, Hyperlipidemia, and Hypertension. SURGICAL HISTORY      has a past surgical history that includes Cardiac surgery (2018); Upper gastrointestinal endoscopy; Colonoscopy; and Lung cancer surgery (2017). CURRENT MEDICATIONS       Previous Medications    ALBUTEROL SULFATE HFA (VENTOLIN HFA) 108 (90 BASE) MCG/ACT INHALER    Inhale 2 puffs into the lungs every 4-6 hours as needed for Wheezing    ATORVASTATIN (LIPITOR) 40 MG TABLET    Take 40 mg by mouth daily    LEVOFLOXACIN (LEVAQUIN) 750 MG TABLET    Take 1 tablet by mouth daily for 4 days    LOSARTAN (COZAAR) 50 MG TABLET    Take 50 mg by mouth daily    METFORMIN, OSM, (FORTAMET) 1000 MG EXTENDED RELEASE TABLET    Take 2,000 mg by mouth daily (with breakfast) Takes 2 tabs (=2000mg) once daily    METOPROLOL TARTRATE (LOPRESSOR) 25 MG TABLET    Take 12.5 mg by mouth daily Takes 1/2 tab (=12.5mg) once daily    PANTOPRAZOLE (PROTONIX) 40 MG TABLET    Take 40 mg by mouth daily    PROPYLENE GLYCOL (SYSTANE COMPLETE OP)    Place 1 drop into both eyes 4 times daily    SEMAGLUTIDE (RYBELSUS) 3 MG TABS    Take 3 mg by mouth daily    UMECLIDINIUM BROMIDE (INCRUSE ELLIPTA) 62.5 MCG/INH AEPB    Inhale 1 puff into the lungs daily    VITAMIN D (CHOLECALCIFEROL) 25 MCG (1000 UT) TABS TABLET    Take 1,000 Units by mouth daily       ALLERGIES     is allergic to cat hair extract and shrimp (diagnostic). FAMILY HISTORY     He indicated that his mother is . He indicated that his father is . family history includes Alzheimer's Disease in his mother; Heart Attack in his father. SOCIAL HISTORY      reports that he quit smoking about 7 years ago. He has a 72.00 pack-year smoking history. He has never used smokeless tobacco. He reports that he does not drink alcohol and does not use drugs.     PHYSICAL EXAM     INITIAL VITALS:  height is 5' 10\" (1.778 m) and weight is 113.4 kg (250 lb). His temperature is 97.9 °F (36.6 °C). His blood pressure is 135/88 and his pulse is 107 (abnormal). His respiration is 16 and oxygen saturation is 95%. Physical Exam  Vitals and nursing note reviewed. Constitutional:       Appearance: Normal appearance. HENT:      Head: Normocephalic and atraumatic. Nose: Nose normal.      Mouth/Throat:      Mouth: Mucous membranes are moist.   Eyes:      Extraocular Movements: Extraocular movements intact. Pupils: Pupils are equal, round, and reactive to light. Cardiovascular:      Rate and Rhythm: Normal rate and regular rhythm. Pulses: Normal pulses. Heart sounds: Normal heart sounds. Pulmonary:      Effort: Pulmonary effort is normal.      Breath sounds: Normal breath sounds. Abdominal:      General: Abdomen is flat. Bowel sounds are normal.      Palpations: Abdomen is soft. Comments: No pulsatile mass   Musculoskeletal:         General: Normal range of motion. Cervical back: Normal range of motion and neck supple. Skin:     General: Skin is warm and dry. Neurological:      General: No focal deficit present. Mental Status: He is alert and oriented to person, place, and time. Mental status is at baseline. Psychiatric:         Mood and Affect: Mood normal.         Behavior: Behavior normal.     DIFFERENTIAL DIAGNOSIS / MDM / EMERGENCY DEPARTMENT COURSE:     The patient was personally seen and evaluated by myself. Head to toe examination did not reveal any acute or new process of concern. His vitals are stable. He was recently seen here and evaluated on Sunday by myself as well as the PA. This was after his outpatient work-up did not reveal any acute process only to follow-up with a CT here on Sunday to find infiltrative process. Patient does have history of lung cancer status post lobectomy. However the patient chose to go home with oral antibiotics.   He admits he only reason he is here today for reevaluation was per the encouragement or recommendation of his nurse practitioner in the family doctor's office home called him this evening to review his symptoms. The patient appears to look well his vitals are stable. We will repeat blood work chest x-ray and EKG for repeat evaluation and most likely be able to discharge him home if there is nothing new or acute found today. Dr. García Davila was the attending physician to follow up on these tests which has yet to be performed. DIAGNOSTIC RESULTS     LABS:  No results found for this visit on 12/08/22. All other labs were within normal range or not returned as of this dictation. RADIOLOGY:  XR CHEST (2 VW)    (Results Pending)       I have reviewed the disposition diagnosis with the patient and or their family/guardian. I have answered their questions and givendischarge instructions. They voiced understanding of these instructions and did not have any further questions or complaints. PROCEDURES:  Unless otherwise noted below, none     Procedures    FINAL IMPRESSION    No diagnosis found. DISPOSITION/PLAN   DISPOSITION        PATIENT REFERRED TO:  No follow-up provider specified.     DISCHARGE MEDICATIONS:  New Prescriptions    No medications on file          (Please note that portions of this note were completed with a voice recognition program.  Efforts were made to edit the dictations but occasionally words are mis-transcribed.)    Orion Valdez DO,(electronically signed)  Board Certified Emergency Physician       Orion Valdez DO  12/08/22 1959

## 2022-12-10 LAB
EKG ATRIAL RATE: 106 BPM
EKG P AXIS: 24 DEGREES
EKG P-R INTERVAL: 152 MS
EKG Q-T INTERVAL: 326 MS
EKG QRS DURATION: 90 MS
EKG QTC CALCULATION (BAZETT): 433 MS
EKG R AXIS: 44 DEGREES
EKG T AXIS: 30 DEGREES
EKG VENTRICULAR RATE: 106 BPM

## 2022-12-17 ENCOUNTER — HOSPITAL ENCOUNTER (EMERGENCY)
Facility: CLINIC | Age: 67
Discharge: HOME | End: 2022-12-17
Attending: EMERGENCY MEDICINE
Payer: MEDICARE

## 2022-12-17 ENCOUNTER — APPOINTMENT (OUTPATIENT)
Dept: CT IMAGING | Facility: CLINIC | Age: 67
End: 2022-12-17
Payer: MEDICARE

## 2022-12-17 VITALS
SYSTOLIC BLOOD PRESSURE: 136 MMHG | TEMPERATURE: 97.9 F | DIASTOLIC BLOOD PRESSURE: 83 MMHG | RESPIRATION RATE: 16 BRPM | HEART RATE: 97 BPM | OXYGEN SATURATION: 94 %

## 2022-12-17 DIAGNOSIS — R10.13 EPIGASTRIC PAIN: Primary | ICD-10-CM

## 2022-12-17 LAB
ABSOLUTE EOS #: 0 K/UL (ref 0–0.4)
ABSOLUTE LYMPH #: 1.2 K/UL (ref 1–4.8)
ABSOLUTE MONO #: 0.6 K/UL (ref 0.1–1.2)
ALBUMIN SERPL-MCNC: 4.2 G/DL (ref 3.5–5.2)
ALBUMIN/GLOBULIN RATIO: 1.4 (ref 1–2.5)
ALP BLD-CCNC: 79 U/L (ref 40–129)
ALT SERPL-CCNC: 33 U/L (ref 5–41)
AMYLASE: 59 U/L (ref 28–100)
ANION GAP SERPL CALCULATED.3IONS-SCNC: 7 MMOL/L (ref 9–17)
AST SERPL-CCNC: 26 U/L
BASOPHILS # BLD: 1 % (ref 0–2)
BASOPHILS ABSOLUTE: 0 K/UL (ref 0–0.2)
BILIRUB SERPL-MCNC: 0.4 MG/DL (ref 0.3–1.2)
BILIRUBIN DIRECT: 0.1 MG/DL
BILIRUBIN URINE: NEGATIVE
BILIRUBIN, INDIRECT: 0.3 MG/DL (ref 0–1)
BUN BLDV-MCNC: 11 MG/DL (ref 8–23)
CALCIUM SERPL-MCNC: 9.4 MG/DL (ref 8.6–10.4)
CHLORIDE BLD-SCNC: 100 MMOL/L (ref 98–107)
CO2: 30 MMOL/L (ref 20–31)
COLOR: YELLOW
COMMENT UA: ABNORMAL
CREAT SERPL-MCNC: 0.7 MG/DL (ref 0.7–1.2)
EOSINOPHILS RELATIVE PERCENT: 1 % (ref 1–4)
GFR SERPL CREATININE-BSD FRML MDRD: >60 ML/MIN/1.73M2
GLUCOSE BLD-MCNC: 94 MG/DL (ref 70–99)
GLUCOSE URINE: NEGATIVE
HCT VFR BLD CALC: 47.2 % (ref 41–53)
HEMOGLOBIN: 15.5 G/DL (ref 13.5–17.5)
KETONES, URINE: ABNORMAL
LEUKOCYTE ESTERASE, URINE: NEGATIVE
LIPASE: 66 U/L (ref 13–60)
LYMPHOCYTES # BLD: 16 % (ref 24–44)
MCH RBC QN AUTO: 30.2 PG (ref 26–34)
MCHC RBC AUTO-ENTMCNC: 32.9 G/DL (ref 31–37)
MCV RBC AUTO: 91.6 FL (ref 80–100)
MONOCYTES # BLD: 9 % (ref 2–11)
NITRITE, URINE: NEGATIVE
PDW BLD-RTO: 14.5 % (ref 12.5–15.4)
PH UA: 6.5 (ref 5–8)
PLATELET # BLD: 240 K/UL (ref 140–450)
PMV BLD AUTO: 6.9 FL (ref 6–12)
POTASSIUM SERPL-SCNC: 4.1 MMOL/L (ref 3.7–5.3)
PROTEIN UA: NEGATIVE
RBC # BLD: 5.15 M/UL (ref 4.5–5.9)
SEG NEUTROPHILS: 73 % (ref 36–66)
SEGMENTED NEUTROPHILS ABSOLUTE COUNT: 5.4 K/UL (ref 1.8–7.7)
SODIUM BLD-SCNC: 137 MMOL/L (ref 135–144)
SPECIFIC GRAVITY UA: 1.02 (ref 1–1.03)
TOTAL PROTEIN: 7.3 G/DL (ref 6.4–8.3)
TROPONIN, HIGH SENSITIVITY: 21 NG/L (ref 0–22)
TURBIDITY: CLEAR
URINE HGB: NEGATIVE
UROBILINOGEN, URINE: NORMAL
WBC # BLD: 7.2 K/UL (ref 3.5–11)

## 2022-12-17 PROCEDURE — 85025 COMPLETE CBC W/AUTO DIFF WBC: CPT

## 2022-12-17 PROCEDURE — 36415 COLL VENOUS BLD VENIPUNCTURE: CPT

## 2022-12-17 PROCEDURE — 84484 ASSAY OF TROPONIN QUANT: CPT

## 2022-12-17 PROCEDURE — 80076 HEPATIC FUNCTION PANEL: CPT

## 2022-12-17 PROCEDURE — 74176 CT ABD & PELVIS W/O CONTRAST: CPT

## 2022-12-17 PROCEDURE — 99284 EMERGENCY DEPT VISIT MOD MDM: CPT

## 2022-12-17 PROCEDURE — 81003 URINALYSIS AUTO W/O SCOPE: CPT

## 2022-12-17 PROCEDURE — 82150 ASSAY OF AMYLASE: CPT

## 2022-12-17 PROCEDURE — 83690 ASSAY OF LIPASE: CPT

## 2022-12-17 PROCEDURE — 80048 BASIC METABOLIC PNL TOTAL CA: CPT

## 2022-12-17 ASSESSMENT — PAIN - FUNCTIONAL ASSESSMENT: PAIN_FUNCTIONAL_ASSESSMENT: NONE - DENIES PAIN

## 2022-12-17 NOTE — ED PROVIDER NOTES
[facility]  [address] [facility]  Phone: [address]      Pt Name: Luis Grier  MRN: [id number]  [place] 1955  Date of evaluation: 12/17/2022    CHIEF COMPLAINT     Abdominal pain and hematuria    HISTORY OF PRESENT ILLNESS    Luis Grier is a 79 y.o. male who presents to the emergency room complaining of abdominal pain and right flank pain with hematuria. Today he had a pain that was severe which is now completely resolved it lasted all night and into today up until about 30 minutes prior to arrival.  He was seen here twice about a week and a half ago and diagnosed with pneumonia and also was seen by his doctor as follow-up. He denies any fevers or chills. He was getting better with regards to his pneumonia but he still has a persistent cough. Denies chest pain. REVIEW OF SYSTEMS       Constitutional: No fevers or chills   HEENT: No sore throat, rhinorrhea, or earache   Eyes: No blurry vision or double vision no drainage   Cardiovascular: No chest pain or tachycardia   Respiratory: No wheezing or shortness of breath positive cough   Gastrointestinal: No nausea, vomiting, diarrhea, constipation, positive upper abdominal pain   : Positive hematuria no dysuria  Musculoskeletal: No extremity swelling or pain positive flank pain  Skin: No rash   Neurological: No focal neurologic complaints, paresthesias, weakness, or headache     PAST MEDICAL HISTORY    has a past medical history of CAD (coronary artery disease), Diabetes mellitus ([address] History of quadruple bypass, Hyperlipidemia, and Hypertension. SURGICAL HISTORY      has a past surgical history that includes Cardiac surgery (2018); Upper gastrointestinal endoscopy; Colonoscopy; and Lung cancer surgery (2017).     CURRENT MEDICATIONS       Previous Medications    ALBUTEROL SULFATE HFA (VENTOLIN HFA) 108 (90 BASE) MCG/ACT INHALER    Inhale 2 puffs into the lungs every 4-6 hours as needed for Wheezing    ATORVASTATIN (LIPITOR) 40 MG TABLET    Take 40 mg by mouth daily    LOSARTAN (COZAAR) 50 MG TABLET    Take 50 mg by mouth daily    METFORMIN, OSM, (FORTAMET) 1000 MG EXTENDED RELEASE TABLET    Take 2,000 mg by mouth daily (with breakfast) Takes 2 tabs (=2000mg) once daily    METOPROLOL TARTRATE (LOPRESSOR) 25 MG TABLET    Take 12.5 mg by mouth daily Takes 1/2 tab (=12.5mg) once daily    PANTOPRAZOLE (PROTONIX) 40 MG TABLET    Take 40 mg by mouth daily    PROPYLENE GLYCOL (SYSTANE COMPLETE OP)    Place 1 drop into both eyes 4 times daily    SEMAGLUTIDE (RYBELSUS) 3 MG TABS    Take 3 mg by mouth daily    UMECLIDINIUM BROMIDE (INCRUSE ELLIPTA) 62.5 MCG/INH AEPB    Inhale 1 puff into the lungs daily    VITAMIN D (CHOLECALCIFEROL) 25 MCG (1000 UT) TABS TABLET    Take 1,000 Units by mouth daily       ALLERGIES     is allergic to cat hair extract and shrimp (diagnostic). FAMILY HISTORY     He indicated that his mother is . He indicated that his father is . family history includes Alzheimer's Disease in his mother; Heart Attack in his father. SOCIAL HISTORY      reports that he quit smoking about 7 years ago. He has a 72.00 pack-year smoking history. He has never used smokeless tobacco. He reports that he does not drink alcohol and does not use drugs. PHYSICAL EXAM     /83   Pulse 97   Temp 97.9 °F (36.6 °C) (Oral)   Resp 16   SpO2 94%     Constitutional: Alert, oriented x3, nontoxic, answering questions appropriately, acting properly for age, in no acute distress   HEENT: Extraocular muscles intact,   Neck: Trachea midline   Cardiovascular: Regular rhythm and rate no murmurs   Respiratory: Diminished to auscultation bilaterally no wheezes, positive bilateral, no rales, no respiratory distress no tachypnea no retractions no hypoxia  Gastrointestinal: Soft, obese nontender, , diminished bowel sounds. No rebound, rigidity, or guarding.   No abdominal bruit no pulsatile mass  Musculoskeletal: No extremity pain or swelling no calf tenderness or asymmetry  Neurologic: Moving all 4 extremities without difficulty there are no gross focal neurologic deficits   Skin: Warm and dry     DIFFERENTIAL DIAGNOSIS/ MDM:     Abdominal and flank pain with hematuria rule out kidney stone. Pain-free currently. Labs. CT abdomen and pelvis. Will check EKG and troponin as well. DIAGNOSTIC RESULTS     EKG: All EKG's are interpreted by the Emergency Department Physician who either signs or Co-signs this chart in the absence of a cardiologist.    1610 sinus rhythm rate 99  QRs 84  no acute ST or T wave changes.     Not indicated unless otherwise documented above    LABS:  Results for orders placed or performed during the hospital encounter of 12/17/22   CBC with Auto Differential   Result Value Ref Range    WBC 7.2 3.5 - 11.0 k/uL    RBC 5.15 4.5 - 5.9 m/uL    Hemoglobin 15.5 13.5 - 17.5 g/dL    Hematocrit 47.2 41 - 53 %    MCV 91.6 80 - 100 fL    MCH 30.2 26 - 34 pg    MCHC 32.9 31 - 37 g/dL    RDW 14.5 12.5 - 15.4 %    Platelets 203 517 - 191 k/uL    MPV 6.9 6.0 - 12.0 fL    Seg Neutrophils 73 (H) 36 - 66 %    Lymphocytes 16 (L) 24 - 44 %    Monocytes 9 2 - 11 %    Eosinophils % 1 1 - 4 %    Basophils 1 0 - 2 %    Segs Absolute 5.40 1.8 - 7.7 k/uL    Absolute Lymph # 1.20 1.0 - 4.8 k/uL    Absolute Mono # 0.60 0.1 - 1.2 k/uL    Absolute Eos # 0.00 0.0 - 0.4 k/uL    Basophils Absolute 0.00 0.0 - 0.2 k/uL   Basic Metabolic Panel   Result Value Ref Range    Glucose 94 70 - 99 mg/dL    BUN 11 8 - 23 mg/dL    Creatinine 0.70 0.70 - 1.20 mg/dL    Est, Glom Filt Rate >60 >60 mL/min/1.73m2    Calcium 9.4 8.6 - 10.4 mg/dL    Sodium 137 135 - 144 mmol/L    Potassium 4.1 3.7 - 5.3 mmol/L    Chloride 100 98 - 107 mmol/L    CO2 30 20 - 31 mmol/L    Anion Gap 7 (L) 9 - 17 mmol/L   Amylase   Result Value Ref Range    Amylase 59 28 - 100 U/L   Lipase   Result Value Ref Range    Lipase 66 (H) 13 - 60 U/L   Hepatic Function Panel   Result Value Ref Range    Albumin 4.2 3.5 - 5.2 g/dL    Alkaline Phosphatase 79 40 - 129 U/L    ALT 33 5 - 41 U/L    AST 26 <40 U/L    Total Bilirubin 0.4 0.3 - 1.2 mg/dL    Bilirubin, Direct 0.1 <0.3 mg/dL    Bilirubin, Indirect 0.3 0.0 - 1.0 mg/dL    Total Protein 7.3 6.4 - 8.3 g/dL    Albumin/Globulin Ratio 1.4 1.0 - 2.5   Urinalysis with Reflex to Culture    Specimen: Urine, clean catch   Result Value Ref Range    Color, UA Yellow Yellow    Turbidity UA Clear Clear    Glucose, Ur NEGATIVE NEGATIVE    Bilirubin Urine NEGATIVE NEGATIVE    Ketones, Urine TRACE (A) NEGATIVE    Specific Gravity, UA 1.020 1.005 - 1.030    Urine Hgb NEGATIVE NEGATIVE    pH, UA 6.5 5.0 - 8.0    Protein, UA NEGATIVE NEGATIVE    Urobilinogen, Urine Normal Normal    Nitrite, Urine NEGATIVE NEGATIVE    Leukocyte Esterase, Urine NEGATIVE NEGATIVE    Urinalysis Comments       Microscopic exam not performed based on chemical results unless requested in original order. Urinalysis Comments          Urinalysis Comments       Utilizing a urinalysis as the only screening method to exclude a potential uropathogen can be unreliable in many patient populations. Rapid screening tests are less sensitive than culture and if UTI is a clinical possibility, culture should be considered despite a negative urinalysis. Troponin   Result Value Ref Range    Troponin, High Sensitivity 21 0 - 22 ng/L       Not indicated unless otherwise documented above    RADIOLOGY:   I reviewed the radiologist interpretations:    CT ABDOMEN PELVIS WO CONTRAST Additional Contrast? None   Final Result   1. A cluster of 3 nonobstructing calculi upper pole right kidney ranging from   3-5 mm in size. 2. Bilateral renal cysts. 3. Indeterminate exophytic 11 mm posterior exophytic right upper pole nodule. This is further characterized MRI if deemed clinically necessary. 4. Sigmoid diverticulosis.              Not indicated unless otherwise documented above    EMERGENCY DEPARTMENT COURSE:     The patient was given the following medications:  No orders of the defined types were placed in this encounter. Vitals:   -------------------------  /83   Pulse 97   Temp 97.9 °F (36.6 °C) (Oral)   Resp 16   SpO2 94%     5:15 PM normal CBC normal electrolytes and kidney function normal pancreatic liver enzymes urine no infection. CT shows a cluster of 3 stones in the right kidney and some renal cysts. After discussion with the patient regarding his abdominal pain he states he is feeling much better. When asked to clarify how long the symptoms have been going on he says he has been having this abdominal pain since the 80s and has been seen by GI specialist and he has had multiple scopes in the past.  Low suspicion for more serious abdominal pain etiology. Will discharge to follow-up and return if worsening symptoms or any other concerns. The patient understands that at this time there is no evidence for a more malignant underlying process, but also understands that early in the process of an illness or injury, an emergency department workup can be falsely reassuring. Routine discharge counseling was given, and it is understood that worsening, changing or persistent symptoms should prompt an immediate call or follow up with their primary physician or return to the emergency department. The importance of appropriate follow up was also discussed. I have reviewed the disposition diagnosis. I have answered the questions and given discharge instructions. There was voiced understanding of these instructions and no further questions or complaints.     CRITICAL CARE:    None    CONSULTS:    None    PROCEDURES:    None      OARRS Report if indicated             FINAL IMPRESSION      1. Epigastric pain          DISPOSITION/PLAN   DISPOSITION Decision To Discharge 12/17/2022 05:13:34 PM        CONDITION ON DISPOSITION: STABLE       PATIENT REFERRED TO:  Nadine Zamora MD  [address] [place] [id number]  [phone]    Schedule an appointment as soon as possible for a visit in 2 days      DISCHARGE MEDICATIONS:  New Prescriptions    No medications on file       (Please note that portions of this note were completed with a voice recognition program.  Efforts were made to edit the dictations but occasionally words are mis-transcribed.)    Rabia Pop DO   Attending Emergency Physician     Rabia Pop DO  12/17/22 0356

## 2022-12-17 NOTE — DISCHARGE INSTRUCTIONS
Return immediately if any worsening symptoms or any other concerns    Tell us how we did visit: [email or web address]   and let us know about your experience

## 2022-12-17 NOTE — ED TRIAGE NOTES
Abdominal pain RUQ ongoing, history of hepatic steatosis, cecal polyp, and hernia. Hx quadruple bypass. Recently diagnosed with pneumonia. States urination more than usual today.

## 2022-12-18 LAB
EKG ATRIAL RATE: 98 BPM
EKG P AXIS: 42 DEGREES
EKG P-R INTERVAL: 150 MS
EKG Q-T INTERVAL: 338 MS
EKG QRS DURATION: 84 MS
EKG QTC CALCULATION (BAZETT): 431 MS
EKG R AXIS: 41 DEGREES
EKG T AXIS: 52 DEGREES
EKG VENTRICULAR RATE: 98 BPM

## 2023-07-23 NOTE — ED TRIAGE NOTES
Cough recent diagnosis with pneumonia and states his NP from his Dr. Saray Faulkner called him today to check in on him and advised his to be re evaluated. Respirations are non=labored.
No